# Patient Record
Sex: MALE | Race: WHITE | NOT HISPANIC OR LATINO | URBAN - METROPOLITAN AREA
[De-identification: names, ages, dates, MRNs, and addresses within clinical notes are randomized per-mention and may not be internally consistent; named-entity substitution may affect disease eponyms.]

---

## 2018-07-17 ENCOUNTER — APPOINTMENT (OUTPATIENT)
Dept: RADIOLOGY | Facility: MEDICAL CENTER | Age: 58
DRG: 956 | End: 2018-07-17
Attending: SURGERY
Payer: COMMERCIAL

## 2018-07-17 ENCOUNTER — APPOINTMENT (OUTPATIENT)
Dept: RADIOLOGY | Facility: MEDICAL CENTER | Age: 58
DRG: 956 | End: 2018-07-17
Attending: NEUROLOGICAL SURGERY
Payer: COMMERCIAL

## 2018-07-17 ENCOUNTER — HOSPITAL ENCOUNTER (INPATIENT)
Facility: MEDICAL CENTER | Age: 58
LOS: 14 days | DRG: 956 | End: 2018-07-31
Attending: EMERGENCY MEDICINE | Admitting: SURGERY
Payer: COMMERCIAL

## 2018-07-17 ENCOUNTER — RESOLUTE PROFESSIONAL BILLING HOSPITAL PROF FEE (OUTPATIENT)
Dept: HOSPITALIST | Facility: MEDICAL CENTER | Age: 58
End: 2018-07-17
Payer: COMMERCIAL

## 2018-07-17 DIAGNOSIS — S32.001A LUMBAR BURST FRACTURE, CLOSED, INITIAL ENCOUNTER (HCC): ICD-10-CM

## 2018-07-17 DIAGNOSIS — S72.302A CLOSED FRACTURE OF SHAFT OF LEFT FEMUR, INITIAL ENCOUNTER (HCC): ICD-10-CM

## 2018-07-17 DIAGNOSIS — S13.171A: ICD-10-CM

## 2018-07-17 DIAGNOSIS — S22.42XA FRACTURE OF RIBS, TWO, LEFT, CLOSED, INITIAL ENCOUNTER: ICD-10-CM

## 2018-07-17 PROBLEM — S11.91XA LACERATION OF NECK: Status: ACTIVE | Noted: 2018-07-17
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
## 2018-07-17 PROBLEM — J96.01 ACUTE RESPIRATORY FAILURE WITH HYPOXIA (HCC): Status: ACTIVE | Noted: 2018-07-17

## 2018-07-17 PROBLEM — T79.4XXA NEUROGENIC SHOCK DUE TO TRAUMATIC INJURY (HCC): Status: ACTIVE | Noted: 2018-07-17

## 2018-07-17 PROBLEM — S37.009A: Status: ACTIVE | Noted: 2018-07-17

## 2018-07-17 LAB
ABO GROUP BLD: NORMAL
ABO GROUP BLD: NORMAL
ACTION RANGE TRIGGERED IACRT: YES
ALBUMIN SERPL BCP-MCNC: 2.1 G/DL (ref 3.2–4.9)
ALBUMIN SERPL BCP-MCNC: 4 G/DL (ref 3.2–4.9)
ALBUMIN/GLOB SERPL: 1.5 G/DL
ALBUMIN/GLOB SERPL: 1.5 G/DL
ALP SERPL-CCNC: 33 U/L (ref 30–99)
ALP SERPL-CCNC: 58 U/L (ref 30–99)
ALT SERPL-CCNC: 39 U/L (ref 2–50)
ALT SERPL-CCNC: 58 U/L (ref 2–50)
ANION GAP SERPL CALC-SCNC: 13 MMOL/L (ref 0–11.9)
ANION GAP SERPL CALC-SCNC: 8 MMOL/L (ref 0–11.9)
APTT PPP: 31.1 SEC (ref 24.7–36)
AST SERPL-CCNC: 58 U/L (ref 12–45)
AST SERPL-CCNC: 64 U/L (ref 12–45)
BASE EXCESS BLDA CALC-SCNC: -11 MMOL/L (ref -4–3)
BASE EXCESS BLDA CALC-SCNC: -7 MMOL/L (ref -4–3)
BASE EXCESS BLDA CALC-SCNC: -9 MMOL/L (ref -4–3)
BASE EXCESS BLDA CALC-SCNC: -9 MMOL/L (ref -4–3)
BASOPHILS # BLD AUTO: 0.3 % (ref 0–1.8)
BASOPHILS # BLD: 0.07 K/UL (ref 0–0.12)
BILIRUB SERPL-MCNC: 0.3 MG/DL (ref 0.1–1.5)
BILIRUB SERPL-MCNC: 0.7 MG/DL (ref 0.1–1.5)
BLD GP AB SCN SERPL QL: NORMAL
BODY TEMPERATURE: ABNORMAL CENTIGRADE
BODY TEMPERATURE: ABNORMAL DEGREES
BUN SERPL-MCNC: 17 MG/DL (ref 8–22)
BUN SERPL-MCNC: 19 MG/DL (ref 8–22)
CALCIUM SERPL-MCNC: 6.4 MG/DL (ref 8.5–10.5)
CALCIUM SERPL-MCNC: 9.4 MG/DL (ref 8.5–10.5)
CFT BLD TEG: 5.6 MIN (ref 5–10)
CHLORIDE SERPL-SCNC: 106 MMOL/L (ref 96–112)
CHLORIDE SERPL-SCNC: 118 MMOL/L (ref 96–112)
CLOT ANGLE BLD TEG: 65 DEGREES (ref 53–72)
CLOT LYSIS 30M P MA LENFR BLD TEG: 4.5 % (ref 0–8)
CO2 BLDA-SCNC: 20 MMOL/L (ref 20–33)
CO2 BLDA-SCNC: 20 MMOL/L (ref 20–33)
CO2 BLDA-SCNC: 21 MMOL/L (ref 20–33)
CO2 SERPL-SCNC: 16 MMOL/L (ref 20–33)
CO2 SERPL-SCNC: 20 MMOL/L (ref 20–33)
CREAT SERPL-MCNC: 1.16 MG/DL (ref 0.5–1.4)
CREAT SERPL-MCNC: 1.62 MG/DL (ref 0.5–1.4)
CT.EXTRINSIC BLD ROTEM: 1.8 MIN (ref 1–3)
EKG IMPRESSION: NORMAL
EOSINOPHIL # BLD AUTO: 0.05 K/UL (ref 0–0.51)
EOSINOPHIL NFR BLD: 0.2 % (ref 0–6.9)
ERYTHROCYTE [DISTWIDTH] IN BLOOD BY AUTOMATED COUNT: 43.5 FL (ref 35.9–50)
ERYTHROCYTE [DISTWIDTH] IN BLOOD BY AUTOMATED COUNT: 44.6 FL (ref 35.9–50)
ETHANOL BLD-MCNC: 0 G/DL
GLOBULIN SER CALC-MCNC: 1.4 G/DL (ref 1.9–3.5)
GLOBULIN SER CALC-MCNC: 2.6 G/DL (ref 1.9–3.5)
GLUCOSE BLD-MCNC: 157 MG/DL (ref 65–99)
GLUCOSE BLD-MCNC: 202 MG/DL (ref 65–99)
GLUCOSE SERPL-MCNC: 185 MG/DL (ref 65–99)
GLUCOSE SERPL-MCNC: 189 MG/DL (ref 65–99)
HCO3 BLDA-SCNC: 16 MMOL/L (ref 17–25)
HCO3 BLDA-SCNC: 18.6 MMOL/L (ref 17–25)
HCO3 BLDA-SCNC: 18.6 MMOL/L (ref 17–25)
HCO3 BLDA-SCNC: 19.4 MMOL/L (ref 17–25)
HCT VFR BLD AUTO: 26.9 % (ref 42–52)
HCT VFR BLD AUTO: 41.6 % (ref 42–52)
HGB BLD-MCNC: 13.7 G/DL (ref 14–18)
HGB BLD-MCNC: 9 G/DL (ref 14–18)
HGB BLD-MCNC: 9.2 G/DL (ref 14–18)
IMM GRANULOCYTES # BLD AUTO: 0.17 K/UL (ref 0–0.11)
IMM GRANULOCYTES NFR BLD AUTO: 0.8 % (ref 0–0.9)
INR PPP: 1.27 (ref 0.87–1.13)
INST. QUALIFIED PATIENT IIQPT: YES
LACTATE BLD-SCNC: 3.6 MMOL/L (ref 0.5–2)
LACTATE BLD-SCNC: 6 MMOL/L (ref 0.5–2)
LV EJECT FRACT  99904: 55
LYMPHOCYTES # BLD AUTO: 2.07 K/UL (ref 1–4.8)
LYMPHOCYTES NFR BLD: 9.9 % (ref 22–41)
MAGNESIUM SERPL-MCNC: 2.2 MG/DL (ref 1.5–2.5)
MCF BLD TEG: 61.3 MM (ref 50–70)
MCH RBC QN AUTO: 31.4 PG (ref 27–33)
MCH RBC QN AUTO: 31.8 PG (ref 27–33)
MCHC RBC AUTO-ENTMCNC: 32.9 G/DL (ref 33.7–35.3)
MCHC RBC AUTO-ENTMCNC: 33.5 G/DL (ref 33.7–35.3)
MCV RBC AUTO: 95.1 FL (ref 81.4–97.8)
MCV RBC AUTO: 95.2 FL (ref 81.4–97.8)
MONOCYTES # BLD AUTO: 1.75 K/UL (ref 0–0.85)
MONOCYTES NFR BLD AUTO: 8.3 % (ref 0–13.4)
NEUTROPHILS # BLD AUTO: 16.86 K/UL (ref 1.82–7.42)
NEUTROPHILS NFR BLD: 80.5 % (ref 44–72)
NRBC # BLD AUTO: 0 K/UL
NRBC BLD-RTO: 0 /100 WBC
O2/TOTAL GAS SETTING VFR VENT: 100 %
O2/TOTAL GAS SETTING VFR VENT: 100 %
O2/TOTAL GAS SETTING VFR VENT: 75 %
PA AA BLD-ACNC: NORMAL %
PA ADP BLD-ACNC: NORMAL %
PCO2 BLDA: 39.5 MMHG (ref 26–37)
PCO2 BLDA: 41 MMHG (ref 26–37)
PCO2 BLDA: 46.2 MMHG (ref 26–37)
PCO2 BLDA: 46.2 MMHG (ref 26–37)
PCO2 TEMP ADJ BLDA: 39.9 MMHG (ref 26–37)
PH BLDA: 7.21 [PH] (ref 7.4–7.5)
PH BLDA: 7.21 [PH] (ref 7.4–7.5)
PH BLDA: 7.22 [PH] (ref 7.4–7.5)
PH BLDA: 7.28 [PH] (ref 7.4–7.5)
PH TEMP ADJ BLDA: 7.29 [PH] (ref 7.4–7.5)
PHOSPHATE SERPL-MCNC: 5.5 MG/DL (ref 2.5–4.5)
PLATELET # BLD AUTO: 160 K/UL (ref 164–446)
PLATELET # BLD AUTO: 228 K/UL (ref 164–446)
PMV BLD AUTO: 10 FL (ref 9–12.9)
PMV BLD AUTO: 10.1 FL (ref 9–12.9)
PO2 BLDA: 132 MMHG (ref 64–87)
PO2 BLDA: 150 MMHG (ref 64–87)
PO2 BLDA: 150 MMHG (ref 64–87)
PO2 BLDA: 155.5 MMHG (ref 64–87)
PO2 TEMP ADJ BLDA: 128 MMHG (ref 64–87)
POTASSIUM SERPL-SCNC: 3.6 MMOL/L (ref 3.6–5.5)
POTASSIUM SERPL-SCNC: 3.7 MMOL/L (ref 3.6–5.5)
PROT SERPL-MCNC: 3.5 G/DL (ref 6–8.2)
PROT SERPL-MCNC: 6.6 G/DL (ref 6–8.2)
PROTHROMBIN TIME: 15.6 SEC (ref 12–14.6)
RBC # BLD AUTO: 2.83 M/UL (ref 4.7–6.1)
RBC # BLD AUTO: 4.37 M/UL (ref 4.7–6.1)
RH BLD: NORMAL
RH BLD: NORMAL
SAO2 % BLDA: 98.2 % (ref 93–99)
SAO2 % BLDA: 99 % (ref 93–99)
SODIUM SERPL-SCNC: 139 MMOL/L (ref 135–145)
SODIUM SERPL-SCNC: 142 MMOL/L (ref 135–145)
SPECIMEN DRAWN FROM PATIENT: ABNORMAL
TEG ALGORITHM TGALG: NORMAL
TRIGL SERPL-MCNC: 193 MG/DL (ref 0–149)
TROPONIN I SERPL-MCNC: 0.01 NG/ML (ref 0–0.04)
WBC # BLD AUTO: 18.6 K/UL (ref 4.8–10.8)
WBC # BLD AUTO: 21 K/UL (ref 4.8–10.8)

## 2018-07-17 PROCEDURE — 0RGA071 FUSION OF THORACOLUMBAR VERTEBRAL JOINT WITH AUTOLOGOUS TISSUE SUBSTITUTE, POSTERIOR APPROACH, POSTERIOR COLUMN, OPEN APPROACH: ICD-10-PCS | Performed by: NEUROLOGICAL SURGERY

## 2018-07-17 PROCEDURE — 85014 HEMATOCRIT: CPT

## 2018-07-17 PROCEDURE — 0PS304Z REPOSITION CERVICAL VERTEBRA WITH INTERNAL FIXATION DEVICE, OPEN APPROACH: ICD-10-PCS | Performed by: NEUROLOGICAL SURGERY

## 2018-07-17 PROCEDURE — 110372 HCHG SHELL REV 278: Performed by: NEUROLOGICAL SURGERY

## 2018-07-17 PROCEDURE — 71045 X-RAY EXAM CHEST 1 VIEW: CPT

## 2018-07-17 PROCEDURE — C1713 ANCHOR/SCREW BN/BN,TIS/BN: HCPCS | Performed by: NEUROLOGICAL SURGERY

## 2018-07-17 PROCEDURE — 83605 ASSAY OF LACTIC ACID: CPT

## 2018-07-17 PROCEDURE — 700111 HCHG RX REV CODE 636 W/ 250 OVERRIDE (IP): Performed by: SURGERY

## 2018-07-17 PROCEDURE — 96365 THER/PROPH/DIAG IV INF INIT: CPT

## 2018-07-17 PROCEDURE — 503195 HCHG SEALER, BIPOLAR AQUAMANTYS: Performed by: NEUROLOGICAL SURGERY

## 2018-07-17 PROCEDURE — 500367 HCHG DRAIN KIT, HEMOVAC: Performed by: NEUROLOGICAL SURGERY

## 2018-07-17 PROCEDURE — 501411 HCHG SPONGE, BABY LAP W/O RINGS: Performed by: NEUROLOGICAL SURGERY

## 2018-07-17 PROCEDURE — 93308 TTE F-UP OR LMTD: CPT

## 2018-07-17 PROCEDURE — 500389 HCHG DRAIN, RESERVOIR SUCT JP 100CC: Performed by: NEUROLOGICAL SURGERY

## 2018-07-17 PROCEDURE — 72128 CT CHEST SPINE W/O DYE: CPT

## 2018-07-17 PROCEDURE — A6222 GAUZE <=16 IN NO W/SAL W/O B: HCPCS | Performed by: NEUROLOGICAL SURGERY

## 2018-07-17 PROCEDURE — 009T0ZZ DRAINAGE OF SPINAL MENINGES, OPEN APPROACH: ICD-10-PCS | Performed by: NEUROLOGICAL SURGERY

## 2018-07-17 PROCEDURE — 76705 ECHO EXAM OF ABDOMEN: CPT

## 2018-07-17 PROCEDURE — 500700 HCHG HEMOCLIP, SMALL (RED): Performed by: NEUROLOGICAL SURGERY

## 2018-07-17 PROCEDURE — 0RS104Z REPOSITION CERVICAL VERTEBRAL JOINT WITH INTERNAL FIXATION DEVICE, OPEN APPROACH: ICD-10-PCS | Performed by: NEUROLOGICAL SURGERY

## 2018-07-17 PROCEDURE — 99291 CRITICAL CARE FIRST HOUR: CPT

## 2018-07-17 PROCEDURE — 82947 ASSAY GLUCOSE BLOOD QUANT: CPT | Mod: 91

## 2018-07-17 PROCEDURE — 700101 HCHG RX REV CODE 250: Performed by: SURGERY

## 2018-07-17 PROCEDURE — 73551 X-RAY EXAM OF FEMUR 1: CPT | Mod: LT

## 2018-07-17 PROCEDURE — 80053 COMPREHEN METABOLIC PANEL: CPT

## 2018-07-17 PROCEDURE — 82330 ASSAY OF CALCIUM: CPT

## 2018-07-17 PROCEDURE — 160031 HCHG SURGERY MINUTES - 1ST 30 MINS LEVEL 5: Performed by: NEUROLOGICAL SURGERY

## 2018-07-17 PROCEDURE — 2W6MX0Z TRACTION OF LEFT LOWER EXTREMITY USING TRACTION APPARATUS: ICD-10-PCS | Performed by: ORTHOPAEDIC SURGERY

## 2018-07-17 PROCEDURE — 302830 HCHG BUCKS UNIVERSAL TRACTION BOOT

## 2018-07-17 PROCEDURE — 80048 BASIC METABOLIC PNL TOTAL CA: CPT

## 2018-07-17 PROCEDURE — 700117 HCHG RX CONTRAST REV CODE 255: Performed by: EMERGENCY MEDICINE

## 2018-07-17 PROCEDURE — 84478 ASSAY OF TRIGLYCERIDES: CPT

## 2018-07-17 PROCEDURE — 85610 PROTHROMBIN TIME: CPT

## 2018-07-17 PROCEDURE — 94002 VENT MGMT INPAT INIT DAY: CPT

## 2018-07-17 PROCEDURE — 86900 BLOOD TYPING SEROLOGIC ABO: CPT

## 2018-07-17 PROCEDURE — C1821 INTERSPINOUS IMPLANT: HCPCS | Performed by: NEUROLOGICAL SURGERY

## 2018-07-17 PROCEDURE — 110454 HCHG SHELL REV 250: Performed by: NEUROLOGICAL SURGERY

## 2018-07-17 PROCEDURE — 305308 HCHG STAPLER,SKIN,DISP.

## 2018-07-17 PROCEDURE — P9045 ALBUMIN (HUMAN), 5%, 250 ML: HCPCS

## 2018-07-17 PROCEDURE — 700105 HCHG RX REV CODE 258: Performed by: SURGERY

## 2018-07-17 PROCEDURE — 72131 CT LUMBAR SPINE W/O DYE: CPT

## 2018-07-17 PROCEDURE — 84484 ASSAY OF TROPONIN QUANT: CPT

## 2018-07-17 PROCEDURE — 500373 HCHG DRAIN, J-P FLAT 10MM 7044: Performed by: NEUROLOGICAL SURGERY

## 2018-07-17 PROCEDURE — 82803 BLOOD GASES ANY COMBINATION: CPT | Mod: 91

## 2018-07-17 PROCEDURE — G0390 TRAUMA RESPONS W/HOSP CRITI: HCPCS

## 2018-07-17 PROCEDURE — 85347 COAGULATION TIME ACTIVATED: CPT

## 2018-07-17 PROCEDURE — 500371 HCHG DRAIN, BLAKE 10MM: Performed by: NEUROLOGICAL SURGERY

## 2018-07-17 PROCEDURE — 37799 UNLISTED PX VASCULAR SURGERY: CPT

## 2018-07-17 PROCEDURE — 770022 HCHG ROOM/CARE - ICU (200)

## 2018-07-17 PROCEDURE — 500864 HCHG NEEDLE, SPINAL 18G: Performed by: NEUROLOGICAL SURGERY

## 2018-07-17 PROCEDURE — 5A1945Z RESPIRATORY VENTILATION, 24-96 CONSECUTIVE HOURS: ICD-10-PCS | Performed by: SURGERY

## 2018-07-17 PROCEDURE — 501838 HCHG SUTURE GENERAL: Performed by: NEUROLOGICAL SURGERY

## 2018-07-17 PROCEDURE — 85576 BLOOD PLATELET AGGREGATION: CPT | Mod: 91

## 2018-07-17 PROCEDURE — 700102 HCHG RX REV CODE 250 W/ 637 OVERRIDE(OP): Performed by: SURGERY

## 2018-07-17 PROCEDURE — 72040 X-RAY EXAM NECK SPINE 2-3 VW: CPT

## 2018-07-17 PROCEDURE — 83735 ASSAY OF MAGNESIUM: CPT

## 2018-07-17 PROCEDURE — 700101 HCHG RX REV CODE 250

## 2018-07-17 PROCEDURE — 85018 HEMOGLOBIN: CPT

## 2018-07-17 PROCEDURE — 82962 GLUCOSE BLOOD TEST: CPT

## 2018-07-17 PROCEDURE — 85384 FIBRINOGEN ACTIVITY: CPT

## 2018-07-17 PROCEDURE — 36430 TRANSFUSION BLD/BLD COMPNT: CPT

## 2018-07-17 PROCEDURE — 0SG1071 FUSION OF 2 OR MORE LUMBAR VERTEBRAL JOINTS WITH AUTOLOGOUS TISSUE SUBSTITUTE, POSTERIOR APPROACH, POSTERIOR COLUMN, OPEN APPROACH: ICD-10-PCS | Performed by: NEUROLOGICAL SURGERY

## 2018-07-17 PROCEDURE — 86850 RBC ANTIBODY SCREEN: CPT

## 2018-07-17 PROCEDURE — 36556 INSERT NON-TUNNEL CV CATH: CPT

## 2018-07-17 PROCEDURE — 85730 THROMBOPLASTIN TIME PARTIAL: CPT

## 2018-07-17 PROCEDURE — 80307 DRUG TEST PRSMV CHEM ANLYZR: CPT

## 2018-07-17 PROCEDURE — 93308 TTE F-UP OR LMTD: CPT | Mod: 26 | Performed by: INTERNAL MEDICINE

## 2018-07-17 PROCEDURE — 85025 COMPLETE CBC W/AUTO DIFF WBC: CPT

## 2018-07-17 PROCEDURE — 85027 COMPLETE CBC AUTOMATED: CPT

## 2018-07-17 PROCEDURE — 160009 HCHG ANES TIME/MIN: Performed by: NEUROLOGICAL SURGERY

## 2018-07-17 PROCEDURE — 72125 CT NECK SPINE W/O DYE: CPT

## 2018-07-17 PROCEDURE — 84295 ASSAY OF SERUM SODIUM: CPT | Mod: 91

## 2018-07-17 PROCEDURE — P9016 RBC LEUKOCYTES REDUCED: HCPCS | Mod: 91

## 2018-07-17 PROCEDURE — 500698 HCHG HEMOCLIP, MEDIUM: Performed by: NEUROLOGICAL SURGERY

## 2018-07-17 PROCEDURE — 0RG10A0 FUSION OF CERVICAL VERTEBRAL JOINT WITH INTERBODY FUSION DEVICE, ANTERIOR APPROACH, ANTERIOR COLUMN, OPEN APPROACH: ICD-10-PCS | Performed by: NEUROLOGICAL SURGERY

## 2018-07-17 PROCEDURE — 500885 HCHG PACK, JACKSON TABLE: Performed by: NEUROLOGICAL SURGERY

## 2018-07-17 PROCEDURE — 70450 CT HEAD/BRAIN W/O DYE: CPT

## 2018-07-17 PROCEDURE — 84100 ASSAY OF PHOSPHORUS: CPT

## 2018-07-17 PROCEDURE — A6402 STERILE GAUZE <= 16 SQ IN: HCPCS | Performed by: NEUROLOGICAL SURGERY

## 2018-07-17 PROCEDURE — 72080 X-RAY EXAM THORACOLMB 2/> VW: CPT

## 2018-07-17 PROCEDURE — 02HV33Z INSERTION OF INFUSION DEVICE INTO SUPERIOR VENA CAVA, PERCUTANEOUS APPROACH: ICD-10-PCS | Performed by: SURGERY

## 2018-07-17 PROCEDURE — 501445 HCHG STAPLER, SKIN DISP: Performed by: NEUROLOGICAL SURGERY

## 2018-07-17 PROCEDURE — 110371 HCHG SHELL REV 272: Performed by: NEUROLOGICAL SURGERY

## 2018-07-17 PROCEDURE — 500331 HCHG COTTONOID, SURG PATTIE: Performed by: NEUROLOGICAL SURGERY

## 2018-07-17 PROCEDURE — 700111 HCHG RX REV CODE 636 W/ 250 OVERRIDE (IP)

## 2018-07-17 PROCEDURE — 160042 HCHG SURGERY MINUTES - EA ADDL 1 MIN LEVEL 5: Performed by: NEUROLOGICAL SURGERY

## 2018-07-17 PROCEDURE — 36620 INSERTION CATHETER ARTERY: CPT

## 2018-07-17 PROCEDURE — 93005 ELECTROCARDIOGRAM TRACING: CPT | Performed by: SURGERY

## 2018-07-17 PROCEDURE — 0RG1071 FUSION OF CERVICAL VERTEBRAL JOINT WITH AUTOLOGOUS TISSUE SUBSTITUTE, POSTERIOR APPROACH, POSTERIOR COLUMN, OPEN APPROACH: ICD-10-PCS | Performed by: NEUROLOGICAL SURGERY

## 2018-07-17 PROCEDURE — 72170 X-RAY EXAM OF PELVIS: CPT

## 2018-07-17 PROCEDURE — 86901 BLOOD TYPING SEROLOGIC RH(D): CPT

## 2018-07-17 PROCEDURE — 99292 CRITICAL CARE ADDL 30 MIN: CPT | Performed by: SURGERY

## 2018-07-17 PROCEDURE — 160048 HCHG OR STATISTICAL LEVEL 1-5: Performed by: NEUROLOGICAL SURGERY

## 2018-07-17 PROCEDURE — 93010 ELECTROCARDIOGRAM REPORT: CPT | Performed by: INTERNAL MEDICINE

## 2018-07-17 PROCEDURE — 30233N1 TRANSFUSION OF NONAUTOLOGOUS RED BLOOD CELLS INTO PERIPHERAL VEIN, PERCUTANEOUS APPROACH: ICD-10-PCS | Performed by: SURGERY

## 2018-07-17 PROCEDURE — 86923 COMPATIBILITY TEST ELECTRIC: CPT | Mod: 91

## 2018-07-17 PROCEDURE — 99291 CRITICAL CARE FIRST HOUR: CPT | Performed by: SURGERY

## 2018-07-17 PROCEDURE — 0RB30ZZ EXCISION OF CERVICAL VERTEBRAL DISC, OPEN APPROACH: ICD-10-PCS | Performed by: NEUROLOGICAL SURGERY

## 2018-07-17 PROCEDURE — 304999 HCHG REPAIR-SIMPLE/INTERMED LEVEL 1

## 2018-07-17 PROCEDURE — 84132 ASSAY OF SERUM POTASSIUM: CPT

## 2018-07-17 PROCEDURE — 03HY32Z INSERTION OF MONITORING DEVICE INTO UPPER ARTERY, PERCUTANEOUS APPROACH: ICD-10-PCS | Performed by: SURGERY

## 2018-07-17 PROCEDURE — 500122 HCHG BOVIE, BLADE: Performed by: NEUROLOGICAL SURGERY

## 2018-07-17 PROCEDURE — C1894 INTRO/SHEATH, NON-LASER: HCPCS | Performed by: EMERGENCY MEDICINE

## 2018-07-17 PROCEDURE — 502000 HCHG MISC OR IMPLANTS RC 0278: Performed by: NEUROLOGICAL SURGERY

## 2018-07-17 PROCEDURE — C1751 CATH, INF, PER/CENT/MIDLINE: HCPCS

## 2018-07-17 PROCEDURE — A9270 NON-COVERED ITEM OR SERVICE: HCPCS | Performed by: SURGERY

## 2018-07-17 PROCEDURE — 0QS004Z REPOSITION LUMBAR VERTEBRA WITH INTERNAL FIXATION DEVICE, OPEN APPROACH: ICD-10-PCS | Performed by: NEUROLOGICAL SURGERY

## 2018-07-17 PROCEDURE — 0PS3XZZ REPOSITION CERVICAL VERTEBRA, EXTERNAL APPROACH: ICD-10-PCS | Performed by: NEUROLOGICAL SURGERY

## 2018-07-17 PROCEDURE — 71260 CT THORAX DX C+: CPT

## 2018-07-17 DEVICE — IMPLANTABLE DEVICE: Type: IMPLANTABLE DEVICE | Status: FUNCTIONAL

## 2018-07-17 DEVICE — BONE CHIPS CANC 4-10MM 15CC - CRUSHED  FREEZE DRIED ONLY: Type: IMPLANTABLE DEVICE | Status: FUNCTIONAL

## 2018-07-17 DEVICE — CAP LOCKING QUARTEX THREADED LOCKING (1TCONX24=24): Type: IMPLANTABLE DEVICE | Status: FUNCTIONAL

## 2018-07-17 DEVICE — BONE CHIPS CANC 4-10MM 30CC - CRUSHED  FREEZE DRIED ONLY: Type: IMPLANTABLE DEVICE | Status: FUNCTIONAL

## 2018-07-17 DEVICE — CAP LOCKING CREO THREADED (1EA): Type: IMPLANTABLE DEVICE | Status: FUNCTIONAL

## 2018-07-17 RX ORDER — SODIUM CHLORIDE, SODIUM LACTATE, POTASSIUM CHLORIDE, CALCIUM CHLORIDE 600; 310; 30; 20 MG/100ML; MG/100ML; MG/100ML; MG/100ML
INJECTION, SOLUTION INTRAVENOUS ONCE
Status: COMPLETED | OUTPATIENT
Start: 2018-07-17 | End: 2018-07-17

## 2018-07-17 RX ORDER — SODIUM CHLORIDE, SODIUM LACTATE, POTASSIUM CHLORIDE, CALCIUM CHLORIDE 600; 310; 30; 20 MG/100ML; MG/100ML; MG/100ML; MG/100ML
INJECTION, SOLUTION INTRAVENOUS CONTINUOUS
Status: DISCONTINUED | OUTPATIENT
Start: 2018-07-17 | End: 2018-07-18

## 2018-07-17 RX ORDER — SODIUM CHLORIDE 9 MG/ML
1000 INJECTION, SOLUTION INTRAVENOUS ONCE
Status: COMPLETED | OUTPATIENT
Start: 2018-07-17 | End: 2018-07-17

## 2018-07-17 RX ORDER — ONDANSETRON 2 MG/ML
4 INJECTION INTRAMUSCULAR; INTRAVENOUS EVERY 4 HOURS PRN
Status: DISCONTINUED | OUTPATIENT
Start: 2018-07-17 | End: 2018-07-18

## 2018-07-17 RX ORDER — NOREPINEPHRINE BITARTRATE 1 MG/ML
INJECTION, SOLUTION INTRAVENOUS
Status: COMPLETED
Start: 2018-07-17 | End: 2018-07-17

## 2018-07-17 RX ORDER — HALOPERIDOL 5 MG/ML
5 INJECTION INTRAMUSCULAR EVERY 4 HOURS PRN
Status: DISCONTINUED | OUTPATIENT
Start: 2018-07-17 | End: 2018-07-17

## 2018-07-17 RX ORDER — SODIUM CHLORIDE, SODIUM LACTATE, POTASSIUM CHLORIDE, AND CALCIUM CHLORIDE .6; .31; .03; .02 G/100ML; G/100ML; G/100ML; G/100ML
IRRIGANT IRRIGATION
Status: DISCONTINUED | OUTPATIENT
Start: 2018-07-17 | End: 2018-07-17 | Stop reason: HOSPADM

## 2018-07-17 RX ORDER — VANCOMYCIN HYDROCHLORIDE 500 MG/10ML
INJECTION, POWDER, LYOPHILIZED, FOR SOLUTION INTRAVENOUS
Status: COMPLETED | OUTPATIENT
Start: 2018-07-17 | End: 2018-07-17

## 2018-07-17 RX ORDER — 3% SODIUM CHLORIDE 3 G/100ML
INJECTION, SOLUTION INTRAVENOUS ONCE
Status: COMPLETED | OUTPATIENT
Start: 2018-07-17 | End: 2018-07-17

## 2018-07-17 RX ORDER — ACETAMINOPHEN 325 MG/1
650 TABLET ORAL EVERY 4 HOURS PRN
Status: DISCONTINUED | OUTPATIENT
Start: 2018-07-17 | End: 2018-07-31 | Stop reason: HOSPADM

## 2018-07-17 RX ORDER — OXYCODONE HYDROCHLORIDE 5 MG/1
5 TABLET ORAL
Status: DISCONTINUED | OUTPATIENT
Start: 2018-07-17 | End: 2018-07-17

## 2018-07-17 RX ORDER — BISACODYL 10 MG
10 SUPPOSITORY, RECTAL RECTAL
Status: DISCONTINUED | OUTPATIENT
Start: 2018-07-17 | End: 2018-07-18

## 2018-07-17 RX ORDER — CHLORHEXIDINE GLUCONATE ORAL RINSE 1.2 MG/ML
15 SOLUTION DENTAL 2 TIMES DAILY
Status: DISCONTINUED | OUTPATIENT
Start: 2018-07-17 | End: 2018-07-18

## 2018-07-17 RX ORDER — BACITRACIN ZINC AND POLYMYXIN B SULFATE 500; 1000 [USP'U]/G; [USP'U]/G
OINTMENT TOPICAL 2 TIMES DAILY
Status: DISCONTINUED | OUTPATIENT
Start: 2018-07-17 | End: 2018-07-29

## 2018-07-17 RX ORDER — AMOXICILLIN 250 MG
1 CAPSULE ORAL NIGHTLY
Status: DISCONTINUED | OUTPATIENT
Start: 2018-07-17 | End: 2018-07-18

## 2018-07-17 RX ORDER — BACITRACIN ZINC AND POLYMYXIN B SULFATE 500; 1000 [USP'U]/G; [USP'U]/G
OINTMENT TOPICAL 3 TIMES DAILY
Status: DISCONTINUED | OUTPATIENT
Start: 2018-07-17 | End: 2018-07-17

## 2018-07-17 RX ORDER — SODIUM CHLORIDE 450 MG/100ML
INJECTION, SOLUTION INTRAVENOUS
Status: ACTIVE
Start: 2018-07-17 | End: 2018-07-17

## 2018-07-17 RX ORDER — ACETAMINOPHEN 650 MG/1
650 SUPPOSITORY RECTAL EVERY 4 HOURS PRN
Status: DISCONTINUED | OUTPATIENT
Start: 2018-07-17 | End: 2018-07-31 | Stop reason: HOSPADM

## 2018-07-17 RX ORDER — ENEMA 19; 7 G/133ML; G/133ML
1 ENEMA RECTAL
Status: DISCONTINUED | OUTPATIENT
Start: 2018-07-17 | End: 2018-07-18

## 2018-07-17 RX ORDER — POLYETHYLENE GLYCOL 3350 17 G/17G
1 POWDER, FOR SOLUTION ORAL 2 TIMES DAILY
Status: DISCONTINUED | OUTPATIENT
Start: 2018-07-17 | End: 2018-07-18

## 2018-07-17 RX ORDER — OXYCODONE HYDROCHLORIDE 10 MG/1
10 TABLET ORAL
Status: DISCONTINUED | OUTPATIENT
Start: 2018-07-17 | End: 2018-07-17

## 2018-07-17 RX ORDER — AMOXICILLIN 250 MG
1 CAPSULE ORAL
Status: DISCONTINUED | OUTPATIENT
Start: 2018-07-17 | End: 2018-07-18

## 2018-07-17 RX ORDER — SODIUM CHLORIDE, SODIUM LACTATE, POTASSIUM CHLORIDE, CALCIUM CHLORIDE 600; 310; 30; 20 MG/100ML; MG/100ML; MG/100ML; MG/100ML
INJECTION, SOLUTION INTRAVENOUS CONTINUOUS
Status: DISCONTINUED | OUTPATIENT
Start: 2018-07-17 | End: 2018-07-17

## 2018-07-17 RX ORDER — DOCUSATE SODIUM 50 MG/5ML
100 LIQUID ORAL 2 TIMES DAILY
Status: DISCONTINUED | OUTPATIENT
Start: 2018-07-17 | End: 2018-07-29

## 2018-07-17 RX ORDER — FAMOTIDINE 20 MG/1
20 TABLET, FILM COATED ORAL 2 TIMES DAILY
Status: DISCONTINUED | OUTPATIENT
Start: 2018-07-17 | End: 2018-07-23

## 2018-07-17 RX ORDER — SODIUM CHLORIDE 9 MG/ML
INJECTION, SOLUTION INTRAVENOUS
Status: COMPLETED | OUTPATIENT
Start: 2018-07-17 | End: 2018-07-17

## 2018-07-17 RX ORDER — DEXTROSE MONOHYDRATE 25 G/50ML
25 INJECTION, SOLUTION INTRAVENOUS
Status: DISCONTINUED | OUTPATIENT
Start: 2018-07-17 | End: 2018-07-20

## 2018-07-17 RX ORDER — BUPIVACAINE HYDROCHLORIDE AND EPINEPHRINE 5; 5 MG/ML; UG/ML
INJECTION, SOLUTION EPIDURAL; INTRACAUDAL; PERINEURAL
Status: DISCONTINUED | OUTPATIENT
Start: 2018-07-17 | End: 2018-07-17 | Stop reason: HOSPADM

## 2018-07-17 RX ADMIN — SODIUM CHLORIDE 1000 ML: 9 INJECTION, SOLUTION INTRAVENOUS at 10:45

## 2018-07-17 RX ADMIN — SODIUM CHLORIDE, POTASSIUM CHLORIDE, SODIUM LACTATE AND CALCIUM CHLORIDE: 600; 310; 30; 20 INJECTION, SOLUTION INTRAVENOUS at 13:41

## 2018-07-17 RX ADMIN — PHENYLEPHRINE HYDROCHLORIDE 50 MCG/MIN: 10 INJECTION INTRAVENOUS at 11:45

## 2018-07-17 RX ADMIN — IOHEXOL 100 ML: 350 INJECTION, SOLUTION INTRAVENOUS at 09:14

## 2018-07-17 RX ADMIN — NOREPINEPHRINE BITARTRATE 25 MCG/MIN: 1 INJECTION INTRAVENOUS at 14:49

## 2018-07-17 RX ADMIN — INSULIN HUMAN 2 UNITS: 100 INJECTION, SOLUTION PARENTERAL at 12:39

## 2018-07-17 RX ADMIN — Medication 1 EACH: at 11:59

## 2018-07-17 RX ADMIN — SODIUM CHLORIDE, POTASSIUM CHLORIDE, SODIUM LACTATE AND CALCIUM CHLORIDE: 600; 310; 30; 20 INJECTION, SOLUTION INTRAVENOUS at 09:30

## 2018-07-17 RX ADMIN — INSULIN HUMAN 3 UNITS: 100 INJECTION, SOLUTION PARENTERAL at 23:25

## 2018-07-17 RX ADMIN — FAMOTIDINE 20 MG: 10 INJECTION, SOLUTION INTRAVENOUS at 23:16

## 2018-07-17 RX ADMIN — SODIUM CHLORIDE, POTASSIUM CHLORIDE, SODIUM LACTATE AND CALCIUM CHLORIDE: 600; 310; 30; 20 INJECTION, SOLUTION INTRAVENOUS at 09:40

## 2018-07-17 RX ADMIN — NOREPINEPHRINE BITARTRATE 30 MCG/MIN: 1 INJECTION INTRAVENOUS at 09:41

## 2018-07-17 RX ADMIN — CEFAZOLIN SODIUM 2 G: 1 INJECTION, SOLUTION INTRAVENOUS at 08:53

## 2018-07-17 RX ADMIN — SODIUM CHLORIDE 1000 ML: 9 INJECTION, SOLUTION INTRAVENOUS at 11:44

## 2018-07-17 RX ADMIN — SODIUM CHLORIDE 1000 ML: 9 INJECTION, SOLUTION INTRAVENOUS at 10:26

## 2018-07-17 RX ADMIN — NOREPINEPHRINE BITARTRATE 8 MG: 1 INJECTION, SOLUTION, CONCENTRATE INTRAVENOUS at 09:30

## 2018-07-17 RX ADMIN — ACETAMINOPHEN 650 MG: 650 SUPPOSITORY RECTAL at 23:30

## 2018-07-17 RX ADMIN — Medication 50 MCG/HR: at 10:47

## 2018-07-17 RX ADMIN — PROPOFOL 30 MCG/KG/MIN: 10 INJECTION, EMULSION INTRAVENOUS at 10:28

## 2018-07-17 RX ADMIN — SODIUM CHLORIDE 1000 ML: 9 INJECTION, SOLUTION INTRAVENOUS at 08:44

## 2018-07-17 RX ADMIN — SODIUM CHLORIDE, POTASSIUM CHLORIDE, SODIUM LACTATE AND CALCIUM CHLORIDE: 600; 310; 30; 20 INJECTION, SOLUTION INTRAVENOUS at 09:45

## 2018-07-17 RX ADMIN — SODIUM CHLORIDE: 3 INJECTION, SOLUTION INTRAVENOUS at 10:05

## 2018-07-17 RX ADMIN — SODIUM CHLORIDE 1000 ML: 9 INJECTION, SOLUTION INTRAVENOUS at 11:30
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
## 2018-07-17 NOTE — OR NURSING
Pt arrived to pre op holding. Armbands verified with OR RN. Surgical consents for Dr. Messina and Dr. Patrick signed by 2 MD's, anesthesia consent signed by 2 MD's. Case declared emergent, pt taken back to OR quickly with OR staff. Full pre op assessment not able to be completed.

## 2018-07-17 NOTE — PROGRESS NOTES
MD updated regarding pt's drop in hemoglobin (13.7 to 9) and hematocrit (41.6 (to 26). MD aware. No new orders received.

## 2018-07-17 NOTE — CARE PLAN
Problem: Psychosocial Needs:  Goal: Level of anxiety will decrease  Outcome: PROGRESSING AS EXPECTED  RN to reorient pt to place, event, and time. RN to orient pt to injuries and plan of treatment to help ease anxiety and encourage pt comfort.
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
## 2018-07-17 NOTE — DISCHARGE PLANNING
HI contacted [facility] Dispatch [phone] and no confirmation of identities at this time.    HI will call back in 30 minutes [phone]

## 2018-07-17 NOTE — PROGRESS NOTES
Critical lab result from [facility] of albumin 2.1 and calcium of 6.4. Preformed corrected calcium calculation for result of 7.9. Meng, PharmD aware.

## 2018-07-17 NOTE — CARE PLAN
Problem: Communication  Goal: The ability to communicate needs accurately and effectively will improve  Outcome: PROGRESSING AS EXPECTED  RN to collaborate with pt on alternate methods of communication such as opening/closing eyes, nodding/shaking head, mouthing, and gesturing.
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
## 2018-07-17 NOTE — ED NOTES
C-spine maintained; MD to examine back; no instability or step off noted; abrasion to inferior R elbow

## 2018-07-17 NOTE — PROGRESS NOTES
Patient arrived to S-103 with ED RN and trauma MD. Patient connected to ICU monitor and transferred to trauma bed.     0917 - Central line and arterial line being placed at bedside. See [procedure navigator.     0925 - Orders for LR at 125 and norephinephrine maxed    0931 - patient connected to code cart    0935 - 1L bolus of LR x2

## 2018-07-17 NOTE — PROGRESS NOTES
Transport at bedside to bring pt to OR. Pre-op monitor not proper for transport due to blood pressure connection missing. Pt to use ICU monitor to go to OR. Pt transferred with ACLS RN, RT, and transport.     1500- Report given to OR, RN and Anesthesiologist. Care of pt transfer to OR team at this time.

## 2018-07-17 NOTE — PROCEDURES
"Central line Op Note    Date of operation: 7/17/2018    Preoperative diagnosis: acute respiratory failure (518.81) and traumatic shock (958.4)    Postoperative diagnosis: acute respiratory failure (518.81) and traumatic shock (958.4)    Operation performed: Insertion of right subclavian introducer and triple-lumen catheter placement.    Surgeon: Dr. Snider    Anesthesia: local anesthesia and pharmacologic restraint     Indications: The patient is a 89 y.o. unknown. Placement of a central line was performed in the SICU    Procedure: Following implied emergent consent, the patient was properly identified and optimally positioned in bed. Maximal barrier precautions were employed. A\" time out\" was initiated. The correct patient, procedure, and operative site was verified. The patient's allergy status was assessed. Procedural modifications were made as required.    The right chest wall was prepped with chlorhexidine and draped into a sterile field  The patient was placed in a shallow Trendelenburg position. The subclavian vein was accessed percutaneously and a wire advanced without resistance. The Cordis catheter was then placed over the wire and the introducer and was removed. For additional access, a previously flushed triple lumen catheter was passed using sterile Selldinger technique through the introducer port and both lines were secured to the skin with silk sutures. All of the ports flushed and aspirated freely. The site was cleaned. An antibacterial disk was placed about the catheter exit site and dressed with a transparent sterile occlusive dressing.    The patient tolerated the procedure well. There were no apparent immediate complications. A stat portable chest radiograph was ordered.    "

## 2018-07-17 NOTE — PROCEDURES
Arterial Line Procedure    Date of operation: 7/17/2018    Preoperative diagnosis: Neurogenic shock    Postoperative diagnosis: Same    Operation performed: insertion of right radial artery catheter.    Surgeon: Dr. Snider    Anesthesia: local anesthesia    Indications: The patient is a 89 y.o. unknown with neurogenic shock secondary to multilevel spinal organ injury. An arterial line is placed in the SICU    Procedure: Following implied emergent consent, the patient was properly identified and optimally positioned in bed. An Dov test was employed to ensure collateral circulation via the ulnar artery to the hand. The ventral surface of the forearm was exposed. The wrist was dorsiflexed and secured. The wrist was prepped and draped in a sterile manner.  The radial artery was canulated percutaneously using ultrasound guidance. A wire was advanced without resistance. The arterial catheter was advanced over the wire using sterile Selldinger technique and connected to an invasive hemodynamic monitoring line. A satisfactory arterial waveform was observed. The catheter was secured to the skin with a slik suture. A sterile dressing was applied.  The patient tolerated the procedure well. There were no apparent immediate complications.

## 2018-07-17 NOTE — DISCHARGE PLANNING
HI contacted [facility] [phone] and spoke to Marilin. She is placing another call to Kimberley to contact HI to discuss getting identification on Pt and notifying family.    SW awaiting phone call from [facility] Department.

## 2018-07-17 NOTE — PROGRESS NOTES
Attention traction to pt's L LE. [place] traction is being replaced with skeletal traction to L LE per [facility]
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
## 2018-07-18 ENCOUNTER — APPOINTMENT (OUTPATIENT)
Dept: RADIOLOGY | Facility: MEDICAL CENTER | Age: 58
DRG: 956 | End: 2018-07-18
Attending: SURGERY
Payer: COMMERCIAL

## 2018-07-18 ENCOUNTER — APPOINTMENT (OUTPATIENT)
Dept: RADIOLOGY | Facility: MEDICAL CENTER | Age: 58
DRG: 956 | End: 2018-07-18
Attending: PHYSICIAN ASSISTANT
Payer: COMMERCIAL

## 2018-07-18 LAB
ACTION RANGE TRIGGERED IACRT: NO
ACTION RANGE TRIGGERED IACRT: YES
ACTION RANGE TRIGGERED IACRT: YES
ALBUMIN SERPL BCP-MCNC: 2.8 G/DL (ref 3.2–4.9)
ALBUMIN/GLOB SERPL: 2.2 G/DL
ALP SERPL-CCNC: 26 U/L (ref 30–99)
ALT SERPL-CCNC: 38 U/L (ref 2–50)
ANION GAP SERPL CALC-SCNC: 15 MMOL/L (ref 0–11.9)
ANION GAP SERPL CALC-SCNC: 5 MMOL/L (ref 0–11.9)
ANION GAP SERPL CALC-SCNC: 9 MMOL/L (ref 0–11.9)
AST SERPL-CCNC: 87 U/L (ref 12–45)
BASE EXCESS BLDA CALC-SCNC: -11 MMOL/L (ref -4–3)
BASE EXCESS BLDA CALC-SCNC: -4 MMOL/L (ref -4–3)
BASE EXCESS BLDA CALC-SCNC: -8 MMOL/L (ref -4–3)
BASOPHILS # BLD AUTO: 0.1 % (ref 0–1.8)
BASOPHILS # BLD: 0.01 K/UL (ref 0–0.12)
BILIRUB SERPL-MCNC: 0.8 MG/DL (ref 0.1–1.5)
BODY TEMPERATURE: ABNORMAL DEGREES
BUN SERPL-MCNC: 16 MG/DL (ref 8–22)
BUN SERPL-MCNC: 17 MG/DL (ref 8–22)
BUN SERPL-MCNC: 17 MG/DL (ref 8–22)
CA-I BLD ISE-SCNC: 0.95 MMOL/L (ref 1.1–1.3)
CA-I BLD ISE-SCNC: 1.07 MMOL/L (ref 1.1–1.3)
CALCIUM SERPL-MCNC: 7.3 MG/DL (ref 8.5–10.5)
CALCIUM SERPL-MCNC: 7.4 MG/DL (ref 8.5–10.5)
CALCIUM SERPL-MCNC: 7.7 MG/DL (ref 8.5–10.5)
CHLORIDE SERPL-SCNC: 112 MMOL/L (ref 96–112)
CHLORIDE SERPL-SCNC: 114 MMOL/L (ref 96–112)
CHLORIDE SERPL-SCNC: 114 MMOL/L (ref 96–112)
CO2 BLDA-SCNC: 18 MMOL/L (ref 20–33)
CO2 BLDA-SCNC: 19 MMOL/L (ref 20–33)
CO2 BLDA-SCNC: 20 MMOL/L (ref 20–33)
CO2 SERPL-SCNC: 17 MMOL/L (ref 20–33)
CO2 SERPL-SCNC: 22 MMOL/L (ref 20–33)
CO2 SERPL-SCNC: 24 MMOL/L (ref 20–33)
CREAT SERPL-MCNC: 1.26 MG/DL (ref 0.5–1.4)
CREAT SERPL-MCNC: 1.48 MG/DL (ref 0.5–1.4)
CREAT SERPL-MCNC: 1.53 MG/DL (ref 0.5–1.4)
EOSINOPHIL # BLD AUTO: 0 K/UL (ref 0–0.51)
EOSINOPHIL NFR BLD: 0 % (ref 0–6.9)
ERYTHROCYTE [DISTWIDTH] IN BLOOD BY AUTOMATED COUNT: 46 FL (ref 35.9–50)
GLOBULIN SER CALC-MCNC: 1.3 G/DL (ref 1.9–3.5)
GLUCOSE BLD-MCNC: 162 MG/DL (ref 65–99)
GLUCOSE BLD-MCNC: 167 MG/DL (ref 65–99)
GLUCOSE BLD-MCNC: 174 MG/DL (ref 65–99)
GLUCOSE BLD-MCNC: 181 MG/DL (ref 65–99)
GLUCOSE BLD-MCNC: 189 MG/DL (ref 65–99)
GLUCOSE SERPL-MCNC: 173 MG/DL (ref 65–99)
GLUCOSE SERPL-MCNC: 174 MG/DL (ref 65–99)
GLUCOSE SERPL-MCNC: 199 MG/DL (ref 65–99)
HCO3 BLDA-SCNC: 17.1 MMOL/L (ref 17–25)
HCO3 BLDA-SCNC: 18.2 MMOL/L (ref 17–25)
HCO3 BLDA-SCNC: 19.4 MMOL/L (ref 17–25)
HCT VFR BLD AUTO: 23.9 % (ref 42–52)
HCT VFR BLD CALC: 24 % (ref 42–52)
HCT VFR BLD CALC: 26 % (ref 42–52)
HGB BLD-MCNC: 6.5 G/DL (ref 14–18)
HGB BLD-MCNC: 7.2 G/DL (ref 14–18)
HGB BLD-MCNC: 8.2 G/DL (ref 14–18)
HGB BLD-MCNC: 8.3 G/DL (ref 14–18)
HGB BLD-MCNC: 8.8 G/DL (ref 14–18)
IMM GRANULOCYTES # BLD AUTO: 0.05 K/UL (ref 0–0.11)
IMM GRANULOCYTES NFR BLD AUTO: 0.4 % (ref 0–0.9)
INST. QUALIFIED PATIENT IIQPT: YES
LACTATE BLD-SCNC: 1.8 MMOL/L (ref 0.5–2)
LACTATE BLD-SCNC: 4.1 MMOL/L (ref 0.5–2)
LYMPHOCYTES # BLD AUTO: 1.85 K/UL (ref 1–4.8)
LYMPHOCYTES NFR BLD: 16.4 % (ref 22–41)
MCH RBC QN AUTO: 31.7 PG (ref 27–33)
MCHC RBC AUTO-ENTMCNC: 34.7 G/DL (ref 33.7–35.3)
MCV RBC AUTO: 91.2 FL (ref 81.4–97.8)
MONOCYTES # BLD AUTO: 1.51 K/UL (ref 0–0.85)
MONOCYTES NFR BLD AUTO: 13.4 % (ref 0–13.4)
NEUTROPHILS # BLD AUTO: 7.88 K/UL (ref 1.82–7.42)
NEUTROPHILS NFR BLD: 69.7 % (ref 44–72)
NRBC # BLD AUTO: 0 K/UL
NRBC BLD-RTO: 0 /100 WBC
O2/TOTAL GAS SETTING VFR VENT: 0.55 %
O2/TOTAL GAS SETTING VFR VENT: 0.6 %
O2/TOTAL GAS SETTING VFR VENT: 50 %
PCO2 BLDA: 28.8 MMHG (ref 26–37)
PCO2 BLDA: 38.5 MMHG (ref 26–37)
PCO2 BLDA: 46 MMHG (ref 26–37)
PCO2 TEMP ADJ BLDA: 29.8 MMHG (ref 26–37)
PCO2 TEMP ADJ BLDA: 40.1 MMHG (ref 26–37)
PCO2 TEMP ADJ BLDA: 47.2 MMHG (ref 26–37)
PH BLDA: 7.18 [PH] (ref 7.4–7.5)
PH BLDA: 7.28 [PH] (ref 7.4–7.5)
PH BLDA: 7.44 [PH] (ref 7.4–7.5)
PH TEMP ADJ BLDA: 7.17 [PH] (ref 7.4–7.5)
PH TEMP ADJ BLDA: 7.27 [PH] (ref 7.4–7.5)
PH TEMP ADJ BLDA: 7.42 [PH] (ref 7.4–7.5)
PLATELET # BLD AUTO: 114 K/UL (ref 164–446)
PMV BLD AUTO: 10.5 FL (ref 9–12.9)
PO2 BLDA: 125 MMHG (ref 64–87)
PO2 BLDA: 86 MMHG (ref 64–87)
PO2 BLDA: 99 MMHG (ref 64–87)
PO2 TEMP ADJ BLDA: 105 MMHG (ref 64–87)
PO2 TEMP ADJ BLDA: 129 MMHG (ref 64–87)
PO2 TEMP ADJ BLDA: 89 MMHG (ref 64–87)
POTASSIUM BLD-SCNC: 4.5 MMOL/L (ref 3.6–5.5)
POTASSIUM BLD-SCNC: 5.9 MMOL/L (ref 3.6–5.5)
POTASSIUM SERPL-SCNC: 4 MMOL/L (ref 3.6–5.5)
POTASSIUM SERPL-SCNC: 4.4 MMOL/L (ref 3.6–5.5)
POTASSIUM SERPL-SCNC: 5.2 MMOL/L (ref 3.6–5.5)
PROT SERPL-MCNC: 4.1 G/DL (ref 6–8.2)
RBC # BLD AUTO: 2.62 M/UL (ref 4.7–6.1)
SAO2 % BLDA: 94 % (ref 93–99)
SAO2 % BLDA: 97 % (ref 93–99)
SAO2 % BLDA: 99 % (ref 93–99)
SODIUM BLD-SCNC: 141 MMOL/L (ref 135–145)
SODIUM BLD-SCNC: 142 MMOL/L (ref 135–145)
SODIUM SERPL-SCNC: 143 MMOL/L (ref 135–145)
SODIUM SERPL-SCNC: 144 MMOL/L (ref 135–145)
SODIUM SERPL-SCNC: 145 MMOL/L (ref 135–145)
SPECIMEN DRAWN FROM PATIENT: ABNORMAL
WBC # BLD AUTO: 11.3 K/UL (ref 4.8–10.8)

## 2018-07-18 PROCEDURE — 94003 VENT MGMT INPAT SUBQ DAY: CPT

## 2018-07-18 PROCEDURE — 80048 BASIC METABOLIC PNL TOTAL CA: CPT

## 2018-07-18 PROCEDURE — 700101 HCHG RX REV CODE 250: Performed by: SURGERY

## 2018-07-18 PROCEDURE — 700111 HCHG RX REV CODE 636 W/ 250 OVERRIDE (IP): Performed by: SURGERY

## 2018-07-18 PROCEDURE — 700105 HCHG RX REV CODE 258: Performed by: SURGERY

## 2018-07-18 PROCEDURE — 71045 X-RAY EXAM CHEST 1 VIEW: CPT

## 2018-07-18 PROCEDURE — 82962 GLUCOSE BLOOD TEST: CPT | Mod: 91

## 2018-07-18 PROCEDURE — 770022 HCHG ROOM/CARE - ICU (200)

## 2018-07-18 PROCEDURE — 36430 TRANSFUSION BLD/BLD COMPNT: CPT

## 2018-07-18 PROCEDURE — P9016 RBC LEUKOCYTES REDUCED: HCPCS

## 2018-07-18 PROCEDURE — 72125 CT NECK SPINE W/O DYE: CPT

## 2018-07-18 PROCEDURE — 99291 CRITICAL CARE FIRST HOUR: CPT | Performed by: SURGERY

## 2018-07-18 PROCEDURE — 85018 HEMOGLOBIN: CPT | Mod: 91

## 2018-07-18 PROCEDURE — 86923 COMPATIBILITY TEST ELECTRIC: CPT

## 2018-07-18 PROCEDURE — 83605 ASSAY OF LACTIC ACID: CPT | Mod: 91

## 2018-07-18 PROCEDURE — 700111 HCHG RX REV CODE 636 W/ 250 OVERRIDE (IP): Performed by: PHYSICIAN ASSISTANT

## 2018-07-18 PROCEDURE — 94150 VITAL CAPACITY TEST: CPT

## 2018-07-18 PROCEDURE — 72131 CT LUMBAR SPINE W/O DYE: CPT

## 2018-07-18 PROCEDURE — 82803 BLOOD GASES ANY COMBINATION: CPT

## 2018-07-18 PROCEDURE — 85025 COMPLETE CBC W/AUTO DIFF WBC: CPT

## 2018-07-18 PROCEDURE — 72128 CT CHEST SPINE W/O DYE: CPT

## 2018-07-18 PROCEDURE — 302136 NUTRITION PUMP: Performed by: SURGERY

## 2018-07-18 PROCEDURE — 80053 COMPREHEN METABOLIC PANEL: CPT

## 2018-07-18 RX ORDER — BISACODYL 10 MG
10 SUPPOSITORY, RECTAL RECTAL
Status: DISCONTINUED | OUTPATIENT
Start: 2018-07-18 | End: 2018-07-25

## 2018-07-18 RX ORDER — AMOXICILLIN 250 MG
1 CAPSULE ORAL NIGHTLY
Status: DISCONTINUED | OUTPATIENT
Start: 2018-07-18 | End: 2018-07-31 | Stop reason: HOSPADM

## 2018-07-18 RX ORDER — PROMETHAZINE HYDROCHLORIDE 25 MG/1
12.5-25 TABLET ORAL EVERY 4 HOURS PRN
Status: DISCONTINUED | OUTPATIENT
Start: 2018-07-18 | End: 2018-07-25

## 2018-07-18 RX ORDER — DEXAMETHASONE SODIUM PHOSPHATE 4 MG/ML
4 INJECTION, SOLUTION INTRA-ARTICULAR; INTRALESIONAL; INTRAMUSCULAR; INTRAVENOUS; SOFT TISSUE EVERY 6 HOURS
Status: COMPLETED | OUTPATIENT
Start: 2018-07-18 | End: 2018-07-18

## 2018-07-18 RX ORDER — SODIUM CHLORIDE, SODIUM LACTATE, POTASSIUM CHLORIDE, CALCIUM CHLORIDE 600; 310; 30; 20 MG/100ML; MG/100ML; MG/100ML; MG/100ML
INJECTION, SOLUTION INTRAVENOUS CONTINUOUS
Status: DISCONTINUED | OUTPATIENT
Start: 2018-07-18 | End: 2018-07-25

## 2018-07-18 RX ORDER — MIDODRINE HYDROCHLORIDE 5 MG/1
10 TABLET ORAL 3 TIMES DAILY
Status: DISCONTINUED | OUTPATIENT
Start: 2018-07-18 | End: 2018-07-31 | Stop reason: HOSPADM

## 2018-07-18 RX ORDER — ONDANSETRON 4 MG/1
4 TABLET, ORALLY DISINTEGRATING ORAL EVERY 4 HOURS PRN
Status: DISCONTINUED | OUTPATIENT
Start: 2018-07-18 | End: 2018-07-31 | Stop reason: HOSPADM

## 2018-07-18 RX ORDER — MORPHINE SULFATE 4 MG/ML
2-4 INJECTION, SOLUTION INTRAMUSCULAR; INTRAVENOUS
Status: DISCONTINUED | OUTPATIENT
Start: 2018-07-18 | End: 2018-07-18

## 2018-07-18 RX ORDER — DOCUSATE SODIUM 100 MG/1
100 CAPSULE, LIQUID FILLED ORAL 2 TIMES DAILY
Status: DISCONTINUED | OUTPATIENT
Start: 2018-07-18 | End: 2018-07-18

## 2018-07-18 RX ORDER — DEXTROSE MONOHYDRATE, SODIUM CHLORIDE, AND POTASSIUM CHLORIDE 50; 1.49; 4.5 G/1000ML; G/1000ML; G/1000ML
INJECTION, SOLUTION INTRAVENOUS CONTINUOUS
Status: DISCONTINUED | OUTPATIENT
Start: 2018-07-18 | End: 2018-07-18

## 2018-07-18 RX ORDER — OXYCODONE HYDROCHLORIDE 5 MG/1
5 TABLET ORAL
Status: DISCONTINUED | OUTPATIENT
Start: 2018-07-18 | End: 2018-07-18

## 2018-07-18 RX ORDER — DIPHENHYDRAMINE HYDROCHLORIDE 50 MG/ML
25 INJECTION INTRAMUSCULAR; INTRAVENOUS EVERY 6 HOURS PRN
Status: DISCONTINUED | OUTPATIENT
Start: 2018-07-18 | End: 2018-07-25

## 2018-07-18 RX ORDER — CALCIUM CARBONATE 500 MG/1
500 TABLET, CHEWABLE ORAL 2 TIMES DAILY
Status: DISCONTINUED | OUTPATIENT
Start: 2018-07-18 | End: 2018-07-18

## 2018-07-18 RX ORDER — CEFAZOLIN SODIUM 2 G/100ML
2 INJECTION, SOLUTION INTRAVENOUS EVERY 8 HOURS
Status: COMPLETED | OUTPATIENT
Start: 2018-07-18 | End: 2018-07-18

## 2018-07-18 RX ORDER — PROMETHAZINE HYDROCHLORIDE 25 MG/1
12.5-25 SUPPOSITORY RECTAL EVERY 4 HOURS PRN
Status: DISCONTINUED | OUTPATIENT
Start: 2018-07-18 | End: 2018-07-25

## 2018-07-18 RX ORDER — POLYETHYLENE GLYCOL 3350 17 G/17G
1 POWDER, FOR SOLUTION ORAL 2 TIMES DAILY PRN
Status: DISCONTINUED | OUTPATIENT
Start: 2018-07-18 | End: 2018-07-31 | Stop reason: HOSPADM

## 2018-07-18 RX ORDER — ONDANSETRON 2 MG/ML
4 INJECTION INTRAMUSCULAR; INTRAVENOUS EVERY 4 HOURS PRN
Status: DISCONTINUED | OUTPATIENT
Start: 2018-07-18 | End: 2018-07-25

## 2018-07-18 RX ORDER — ENEMA 19; 7 G/133ML; G/133ML
1 ENEMA RECTAL
Status: DISCONTINUED | OUTPATIENT
Start: 2018-07-18 | End: 2018-07-31 | Stop reason: HOSPADM

## 2018-07-18 RX ORDER — METHOCARBAMOL 750 MG/1
750 TABLET, FILM COATED ORAL EVERY 8 HOURS PRN
Status: DISCONTINUED | OUTPATIENT
Start: 2018-07-18 | End: 2018-07-18

## 2018-07-18 RX ORDER — AMOXICILLIN 250 MG
1 CAPSULE ORAL
Status: DISCONTINUED | OUTPATIENT
Start: 2018-07-18 | End: 2018-07-31 | Stop reason: HOSPADM

## 2018-07-18 RX ORDER — DIPHENHYDRAMINE HCL 25 MG
25 TABLET ORAL EVERY 6 HOURS PRN
Status: DISCONTINUED | OUTPATIENT
Start: 2018-07-18 | End: 2018-07-25

## 2018-07-18 RX ADMIN — Medication 1 EACH: at 17:36

## 2018-07-18 RX ADMIN — DEXAMETHASONE SODIUM PHOSPHATE 4 MG: 4 INJECTION, SOLUTION INTRAMUSCULAR; INTRAVENOUS at 11:42

## 2018-07-18 RX ADMIN — INSULIN HUMAN 2 UNITS: 100 INJECTION, SOLUTION PARENTERAL at 17:50

## 2018-07-18 RX ADMIN — INSULIN HUMAN 2 UNITS: 100 INJECTION, SOLUTION PARENTERAL at 11:42

## 2018-07-18 RX ADMIN — SODIUM CHLORIDE, POTASSIUM CHLORIDE, SODIUM LACTATE AND CALCIUM CHLORIDE: 600; 310; 30; 20 INJECTION, SOLUTION INTRAVENOUS at 13:36

## 2018-07-18 RX ADMIN — NOREPINEPHRINE BITARTRATE 25 MCG/MIN: 1 INJECTION INTRAVENOUS at 02:01

## 2018-07-18 RX ADMIN — PROPOFOL 15 MCG/KG/MIN: 10 INJECTION, EMULSION INTRAVENOUS at 18:15

## 2018-07-18 RX ADMIN — Medication 1 EACH: at 04:59

## 2018-07-18 RX ADMIN — DEXAMETHASONE SODIUM PHOSPHATE 4 MG: 4 INJECTION, SOLUTION INTRAMUSCULAR; INTRAVENOUS at 17:36

## 2018-07-18 RX ADMIN — CEFAZOLIN SODIUM 2 G: 2 INJECTION, SOLUTION INTRAVENOUS at 09:04

## 2018-07-18 RX ADMIN — FAMOTIDINE 20 MG: 10 INJECTION, SOLUTION INTRAVENOUS at 04:59

## 2018-07-18 RX ADMIN — SODIUM CHLORIDE, POTASSIUM CHLORIDE, SODIUM LACTATE AND CALCIUM CHLORIDE: 600; 310; 30; 20 INJECTION, SOLUTION INTRAVENOUS at 08:00

## 2018-07-18 RX ADMIN — PROPOFOL 15 MCG/KG/MIN: 10 INJECTION, EMULSION INTRAVENOUS at 10:20

## 2018-07-18 RX ADMIN — DEXAMETHASONE SODIUM PHOSPHATE 4 MG: 4 INJECTION, SOLUTION INTRAMUSCULAR; INTRAVENOUS at 09:05

## 2018-07-18 RX ADMIN — PROPOFOL 15 MCG/KG/MIN: 10 INJECTION, EMULSION INTRAVENOUS at 02:02

## 2018-07-18 RX ADMIN — PHENYLEPHRINE HYDROCHLORIDE 70 MCG/MIN: 10 INJECTION INTRAVENOUS at 02:01

## 2018-07-18 RX ADMIN — FAMOTIDINE 20 MG: 10 INJECTION, SOLUTION INTRAVENOUS at 17:36

## 2018-07-18 RX ADMIN — NOREPINEPHRINE BITARTRATE 5 MCG/MIN: 1 INJECTION INTRAVENOUS at 17:49

## 2018-07-18 RX ADMIN — NOREPINEPHRINE BITARTRATE 23 MCG/MIN: 1 INJECTION INTRAVENOUS at 07:45

## 2018-07-18 RX ADMIN — INSULIN HUMAN 2 UNITS: 100 INJECTION, SOLUTION PARENTERAL at 05:08

## 2018-07-18 RX ADMIN — CEFAZOLIN SODIUM 2 G: 2 INJECTION, SOLUTION INTRAVENOUS at 16:23

## 2018-07-18 ASSESSMENT — PULMONARY FUNCTION TESTS: FVC: 1750

## 2018-07-18 NOTE — CARE PLAN
Problem: Lumbar/thoracic spine surgery  Goal: Post Operative care of the Lumbar/Thoracic Spine surgery patient    Intervention: Follow spinal precautions with all movement  Patient still strict log roll pending further lumbar spine stabilization.  Aspen collar in place at all times.  Patient is in reverse trendelenburg head of bed ok to 30 degrees.       Problem: Neuro Status  Goal: Monitor neuro status and rapid identification of neuro changes  Neuro checks every four hours per MD.  Patient following commands on upper extremities; will notify MD of any changes.
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
## 2018-07-18 NOTE — PROGRESS NOTES
Pt to S103 at this time    Pt is transported on monitor with RN and anesthesiologist.      / 75  RR 17  o2 100% on 70% fio2  Temp 101.1    Wt: 113.0kg    2 RN skin check,  Pt has generalized bruising and abrasions, surgical incisions have slight drainage, but are otherwise CDI. There is an area of ecchymosis on pts left buttock, midline.     Pt given CHG bath at this time.

## 2018-07-18 NOTE — PROGRESS NOTES
Reba from Lab called with critical result of lactic acid at 6.0. Critical lab result read back to Reba.   MANDY Zendejas notified of critical lab result at 0000.  Repeat lactic with AM labs

## 2018-07-18 NOTE — PROGRESS NOTES
Cortrak Placement    Tube Team verified patient name and medical record number prior to tube placement.  Cortrak tube (55 inches, 10 [place] Islander) placed at 95 cm in right nare.  Per Cortrak picture, tube appears to be in the small bowel.  Nursing Instructions: Awaiting KUB to confirm placement before use for medications or feeding. Once placement confirmed, flush tube with 30 ml of water, and then remove and save stylet, in patient medication drawer.
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
## 2018-07-18 NOTE — PROGRESS NOTES
Ti RAMOS at bedside. Discussed plan of care. Wants to continue with log rolling, but okay to do Q4 neuro checks.

## 2018-07-18 NOTE — PROGRESS NOTES
Neurosurgery Progress Note    Subjective:  POD#1 C6-7 360 degree fusion with T12 - L4 laminectomy and fusion.   No changes overnight.    Exam:  Awake and alert. Intubated.  Follows commands on uppers. Moves both upper extremities well. 0/5 to bilateral lowers. No sensation to bilateral lowers.     BP  Min: 104/80  Max: 123/86  Pulse  Av.6  Min: 71  Max: 126  Resp  Av.6  Min: 6  Max: 25  Temp  Av.4 °C (97.6 °F)  Min: 34.4 °C (94 °F)  Max: 38.4 °C (101.1 °F)  Monitored Temp 2  Av.2 °C (99 °F)  Min: 35.2 °C (95.4 °F)  Max: 38.3 °C (100.9 °F)  SpO2  Av.7 %  Min: 99 %  Max: 100 %    No Data Recorded    Recent Labs      18   0846  18   1118  18   2330  18   0500   WBC  18.6*  21.0*   --   11.3*   RBC  4.37*  2.83*   --   2.62*   HEMOGLOBIN  13.7*  9.0*  9.2*  8.3*   HEMATOCRIT  41.6*  26.9*   --   23.9*   MCV  95.2  95.1   --   91.2   MCH  31.4  31.8   --   31.7   MCHC  32.9*  33.5*   --   34.7   RDW  44.6  43.5   --   46.0   PLATELETCT  228  160*   --   114*   MPV  10.1  10.0   --   10.5     Recent Labs      18   1118  18   2330  18   0500   SODIUM  142  144  145   POTASSIUM  3.6  5.2  4.4   CHLORIDE  118*  112  114*   CO2  16*  17*  22   GLUCOSE  185*  199*  174*   BUN    17   CREATININE  1.16  1.53*  1.48*   CALCIUM  6.4*  7.7*  7.4*     Recent Labs      18   0936   APTT  31.1   INR  1.27*     Recent Labs      18   0846   REACTMIN  5.6   CLOTKINET  1.8   CLOTANGL  65.0   MAXCLOTS  61.3   [id number]  4.5   PRCINADP  see comment   PRCINAA  see comment       Intake/Output       18 07 - 18 0659 18 - 18 0659      [id number] Total  Total       Intake    I.V.  6934.2  9660.4 18733.6  --  -- --    Crystalloid Intake -- 5500 5500 -- -- --    Other Colloid Intake -- 1000 1000 -- -- --    Pre-Hospital Volume 200 -- 200 -- -- --    Trauma Resuscitation Volume 100 -- 100 -- -- --     Phenylephrine Volume 72.9 624.5 697.4 -- -- --    Propofol Volume 43.4 156.8 200.2 -- -- --    Norepinephrine Volume 165.9 375.2 541.1 -- -- --    Ancef 100 -- 100 -- -- --    IV Volume (LR Bolus) 2000 -- 2000 -- -- --    IV Volume (NS Bolus) 3000 -- 3000 -- -- --    IV Volume (Maintence Fluid) 1250 2000 3250 -- -- --    IV Volume (Fentanyl) 2 4 6 -- -- --    Blood  0  -- 0  --  -- --    PRBC Total Volume (Non-Barcoded) 0 -- 0 -- -- --    FFP Total Volume (Non-Barcoded) 0 -- 0 -- -- --    Platelets Total Volume (Non-Barcoded) 0 -- 0 -- -- --    Cryoprecipitate (Pooled) Total Volume (Non-Barcoded) 0 -- 0 -- -- --    Cryoprecipitate (Single) Total Volume (Non-Barcoded) 0 -- 0 -- -- --    Total Intake 6934.2 9660.4 48415.6 -- -- --       Output    Urine  2085 2000 4085  --  -- --    Output (mL) (Urinary Catheter Indwelling Catheter) 2085 2000 4085 -- -- --    Drains  --  410 410  --  -- --    Output (ml) (Surgical Drain Group Neck Allen Aquino 1 (A)) -- 100 100 -- -- --    Output (ml) (Surgical Drain Group Neck Allen Aquino 2 (B)) -- 30 30 -- -- --    Output (ml) (Surgical Drain Group Back Allen Aquino 3 (C)) -- 280 280 -- -- --    Other  100  -- 100  --  -- --    Pre-Hospital Output 0 -- 0 -- -- --    Trauma Resuscitation Output 100 -- 100 -- -- --    Stool  0  -- 0  --  -- --    Number of Times Stooled 0 x -- 0 x -- -- --    Measurable Stool (mL) 0 -- 0 -- -- --    Emesis/NG output  --  50 50  --  -- --    Output (mL) (Enteral Tube Oral Oral Gastric) -- 50 50 -- -- --    Blood  0  550 550  --  -- --    Est. Blood Loss (mL) 0 550 550 -- -- --    Total Output 2185 3010 5195 -- -- --       Net I/O     4749.2 6650.4 71441.6 -- -- --            Intake/Output Summary (Last 24 hours) at 07/18/18 0824  Last data filed at 07/18/18 0600   Gross per 24 hour   Intake         61812.62 ml   Output             5195 ml   Net         50727.62 ml            • Pharmacy Consult Request  1 Each PRN   • MD ALERT...Do not administer  NSAIDS or ASPIRIN unless ORDERED By Neurosurgery  1 Each PRN   • senna-docusate  1 Tab Nightly   • senna-docusate  1 Tab Q24HRS PRN   • polyethylene glycol/lytes  1 Packet BID PRN   • magnesium hydroxide  30 mL QDAY PRN   • bisacodyl  10 mg Q24HRS PRN   • fleet  1 Each Once PRN   • dextrose 5 % and 0.45 % NaCl with KCl 20 mEq   Continuous   • ceFAZolin  2 g Q8HR   • diphenhydrAMINE  25 mg Q6HRS PRN    Or   • diphenhydrAMINE  25 mg Q6HRS PRN   • ondansetron  4 mg Q4HRS PRN   • ondansetron  4 mg Q4HRS PRN   • promethazine  12.5-25 mg Q4HRS PRN   • promethazine  12.5-25 mg Q4HRS PRN   • prochlorperazine  5-10 mg Q4HRS PRN   • dexamethasone  4 mg Q6HRS   • methocarbamol  750 mg Q8HRS PRN   • calcium carbonate  500 mg BID   • benzocaine-menthol  1 Lozenge Q2HRS PRN   • Respiratory Care per Protocol   Continuous RT   • docusate sodium 100mg/10mL  100 mg BID   • acetaminophen  650 mg Q4HRS PRN    Or   • acetaminophen  650 mg Q4HRS PRN   • insulin regular  2-9 Units Q6HRS    And   • glucose 4 g  16 g Q15 MIN PRN    And   • dextrose 50%  25 mL Q15 MIN PRN   • NORepinephrine (LEVOPHED) infusion  0-30 mcg/min Continuous   • propofol  0-80 mcg/kg/min Continuous   • Phenylephrine infusion  0-300 mcg/min Continuous   • fentaNYL   Continuous   • fentaNYL  25 mcg Q HOUR PRN    Or   • fentaNYL  50 mcg Q HOUR PRN    Or   • fentaNYL  100 mcg Q HOUR PRN   • bacitracin-polymyxin b   BID   • famotidine  20 mg BID    Or   • famotidine  20 mg BID   • chlorhexidine  15 mL BID       Assessment and Plan:  Hospital day #2  POD #1 C6-7 360 degree fusion, T12 - L4 laminectomy and fusion. Needs L2 corpectomy. Continue log roll until then.

## 2018-07-18 NOTE — CARE PLAN
Problem: Ventilation Defect:  Goal: Ability to achieve and maintain unassisted ventilation or tolerate decreased levels of ventilator support  Adult Ventilation Update    Total Vent Days: 2    Patient Lines/Drains/Airways Status    Active Airway     Name: Placement date: Placement time: Site: Days:    Airway Group ET Tube 8.0 07/17/18   0847      less than 1              Patient has a cervical fracture surgery done tonight. Patient was on CMV settting but was switched to ASV due to breath stacking and patient doing spontaneous breath. Patient was comfortable with , +8, 50%. NO SBT has been done. Will do SBT today.
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
## 2018-07-18 NOTE — CARE PLAN
Problem: Pain Management  Goal: Pain level will decrease to patient's comfort goal  Outcome: PROGRESSING AS EXPECTED  Pt pain assessed using appropriate pain scale, CPOT and treated per MAR. Non-pharm interventions in place such as re-positioning and temperature therapy and mobilization      Problem: Skin Integrity  Goal: Risk for impaired skin integrity will decrease  Outcome: PROGRESSING AS EXPECTED  Pt on q2hr turn schedule, skin checked, linens changed PRN
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
## 2018-07-18 NOTE — PROGRESS NOTES
Clarified with Levi RAMOS for neurosurgery that patient has not been cleared for orthopedic surgery yet at this time.  Ortho paged and notified of plan of care.

## 2018-07-18 NOTE — PROGRESS NOTES
Applied 15 lb skeletal traction to patients LLE, positive cms pre and post application  If you have any further questions please call traction at [phone]

## 2018-07-18 NOTE — PROGRESS NOTES
Trauma/Surgery Progress Note    Pt back from surgery  On levo/kb gtts  Making urine  Serial hgb and repeat lactic acid ordered    Per neuro, spine is still unstable and will need anterior approach, continue log roll precaution and HOB not to exceed 30 degrees

## 2018-07-18 NOTE — PROGRESS NOTES
Updated PA Louie Chiang on pt's neuro exam/status (following and moving in BLUEs, flaccid and no sensation on BLLEs).    No new orders at this time

## 2018-07-19 ENCOUNTER — APPOINTMENT (OUTPATIENT)
Dept: RADIOLOGY | Facility: MEDICAL CENTER | Age: 58
DRG: 956 | End: 2018-07-19
Attending: SURGERY
Payer: COMMERCIAL

## 2018-07-19 ENCOUNTER — APPOINTMENT (OUTPATIENT)
Dept: RADIOLOGY | Facility: MEDICAL CENTER | Age: 58
DRG: 956 | End: 2018-07-19
Attending: ORTHOPAEDIC SURGERY
Payer: COMMERCIAL

## 2018-07-19 PROBLEM — D64.9 ANEMIA: Status: ACTIVE | Noted: 2018-07-19

## 2018-07-19 PROBLEM — Z51.81 INADEQUATE ANTICOAGULATION: Status: ACTIVE | Noted: 2018-07-19

## 2018-07-19 PROBLEM — Z79.01 INADEQUATE ANTICOAGULATION: Status: ACTIVE | Noted: 2018-07-19

## 2018-07-19 PROBLEM — R13.12 OROPHARYNGEAL DYSPHAGIA: Status: ACTIVE | Noted: 2018-07-19

## 2018-07-19 PROBLEM — E83.39 HYPOPHOSPHATEMIA: Status: ACTIVE | Noted: 2018-07-19

## 2018-07-19 LAB
ALBUMIN SERPL BCP-MCNC: 2.5 G/DL (ref 3.2–4.9)
ALBUMIN/GLOB SERPL: 1.6 G/DL
ALP SERPL-CCNC: 31 U/L (ref 30–99)
ALT SERPL-CCNC: 30 U/L (ref 2–50)
ANION GAP SERPL CALC-SCNC: 4 MMOL/L (ref 0–11.9)
AST SERPL-CCNC: 67 U/L (ref 12–45)
BASOPHILS # BLD AUTO: 0.1 % (ref 0–1.8)
BASOPHILS # BLD AUTO: 0.1 % (ref 0–1.8)
BASOPHILS # BLD: 0.01 K/UL (ref 0–0.12)
BASOPHILS # BLD: 0.01 K/UL (ref 0–0.12)
BILIRUB SERPL-MCNC: 0.7 MG/DL (ref 0.1–1.5)
BUN SERPL-MCNC: 17 MG/DL (ref 8–22)
CALCIUM SERPL-MCNC: 7.2 MG/DL (ref 8.5–10.5)
CHLORIDE SERPL-SCNC: 113 MMOL/L (ref 96–112)
CO2 SERPL-SCNC: 23 MMOL/L (ref 20–33)
CREAT SERPL-MCNC: 1.05 MG/DL (ref 0.5–1.4)
EOSINOPHIL # BLD AUTO: 0 K/UL (ref 0–0.51)
EOSINOPHIL # BLD AUTO: 0 K/UL (ref 0–0.51)
EOSINOPHIL NFR BLD: 0 % (ref 0–6.9)
EOSINOPHIL NFR BLD: 0 % (ref 0–6.9)
ERYTHROCYTE [DISTWIDTH] IN BLOOD BY AUTOMATED COUNT: 55.8 FL (ref 35.9–50)
ERYTHROCYTE [DISTWIDTH] IN BLOOD BY AUTOMATED COUNT: 56.8 FL (ref 35.9–50)
GLOBULIN SER CALC-MCNC: 1.6 G/DL (ref 1.9–3.5)
GLUCOSE BLD-MCNC: 119 MG/DL (ref 65–99)
GLUCOSE BLD-MCNC: 131 MG/DL (ref 65–99)
GLUCOSE BLD-MCNC: 131 MG/DL (ref 65–99)
GLUCOSE BLD-MCNC: 146 MG/DL (ref 65–99)
GLUCOSE SERPL-MCNC: 133 MG/DL (ref 65–99)
HCT VFR BLD AUTO: 21.9 % (ref 42–52)
HCT VFR BLD AUTO: 24 % (ref 42–52)
HGB BLD-MCNC: 6.8 G/DL (ref 14–18)
HGB BLD-MCNC: 6.8 G/DL (ref 14–18)
HGB BLD-MCNC: 7.6 G/DL (ref 14–18)
HGB BLD-MCNC: 8.4 G/DL (ref 14–18)
IMM GRANULOCYTES # BLD AUTO: 0.06 K/UL (ref 0–0.11)
IMM GRANULOCYTES # BLD AUTO: 0.06 K/UL (ref 0–0.11)
IMM GRANULOCYTES NFR BLD AUTO: 0.5 % (ref 0–0.9)
IMM GRANULOCYTES NFR BLD AUTO: 0.6 % (ref 0–0.9)
LACTATE BLD-SCNC: 1.3 MMOL/L (ref 0.5–2)
LYMPHOCYTES # BLD AUTO: 0.73 K/UL (ref 1–4.8)
LYMPHOCYTES # BLD AUTO: 1.02 K/UL (ref 1–4.8)
LYMPHOCYTES NFR BLD: 7.1 % (ref 22–41)
LYMPHOCYTES NFR BLD: 8.8 % (ref 22–41)
MAGNESIUM SERPL-MCNC: 1.8 MG/DL (ref 1.5–2.5)
MCH RBC QN AUTO: 30.2 PG (ref 27–33)
MCH RBC QN AUTO: 30.4 PG (ref 27–33)
MCHC RBC AUTO-ENTMCNC: 34.7 G/DL (ref 33.7–35.3)
MCHC RBC AUTO-ENTMCNC: 35 G/DL (ref 33.7–35.3)
MCV RBC AUTO: 86.9 FL (ref 81.4–97.8)
MCV RBC AUTO: 87 FL (ref 81.4–97.8)
MONOCYTES # BLD AUTO: 0.85 K/UL (ref 0–0.85)
MONOCYTES # BLD AUTO: 1.31 K/UL (ref 0–0.85)
MONOCYTES NFR BLD AUTO: 11.4 % (ref 0–13.4)
MONOCYTES NFR BLD AUTO: 8.3 % (ref 0–13.4)
NEUTROPHILS # BLD AUTO: 8.58 K/UL (ref 1.82–7.42)
NEUTROPHILS # BLD AUTO: 9.13 K/UL (ref 1.82–7.42)
NEUTROPHILS NFR BLD: 79.2 % (ref 44–72)
NEUTROPHILS NFR BLD: 83.9 % (ref 44–72)
NRBC # BLD AUTO: 0 K/UL
NRBC # BLD AUTO: 0 K/UL
NRBC BLD-RTO: 0 /100 WBC
NRBC BLD-RTO: 0 /100 WBC
PHOSPHATE SERPL-MCNC: 2 MG/DL (ref 2.5–4.5)
PLATELET # BLD AUTO: 71 K/UL (ref 164–446)
PLATELET # BLD AUTO: 80 K/UL (ref 164–446)
PMV BLD AUTO: 10.6 FL (ref 9–12.9)
PMV BLD AUTO: 11.2 FL (ref 9–12.9)
POTASSIUM SERPL-SCNC: 3.8 MMOL/L (ref 3.6–5.5)
PROT SERPL-MCNC: 4.1 G/DL (ref 6–8.2)
RBC # BLD AUTO: 2.52 M/UL (ref 4.7–6.1)
RBC # BLD AUTO: 2.76 M/UL (ref 4.7–6.1)
SODIUM SERPL-SCNC: 140 MMOL/L (ref 135–145)
TRIGL SERPL-MCNC: 302 MG/DL (ref 0–149)
WBC # BLD AUTO: 10.2 K/UL (ref 4.8–10.8)
WBC # BLD AUTO: 11.5 K/UL (ref 4.8–10.8)

## 2018-07-19 PROCEDURE — 85025 COMPLETE CBC W/AUTO DIFF WBC: CPT

## 2018-07-19 PROCEDURE — 160009 HCHG ANES TIME/MIN: Performed by: ORTHOPAEDIC SURGERY

## 2018-07-19 PROCEDURE — 36430 TRANSFUSION BLD/BLD COMPNT: CPT

## 2018-07-19 PROCEDURE — 700101 HCHG RX REV CODE 250: Performed by: SURGERY

## 2018-07-19 PROCEDURE — 83735 ASSAY OF MAGNESIUM: CPT

## 2018-07-19 PROCEDURE — 0QS936Z REPOSITION LEFT FEMORAL SHAFT WITH INTRAMEDULLARY INTERNAL FIXATION DEVICE, PERCUTANEOUS APPROACH: ICD-10-PCS | Performed by: ORTHOPAEDIC SURGERY

## 2018-07-19 PROCEDURE — 700111 HCHG RX REV CODE 636 W/ 250 OVERRIDE (IP): Performed by: SURGERY

## 2018-07-19 PROCEDURE — 700105 HCHG RX REV CODE 258: Performed by: SURGERY

## 2018-07-19 PROCEDURE — 501838 HCHG SUTURE GENERAL: Performed by: ORTHOPAEDIC SURGERY

## 2018-07-19 PROCEDURE — A9270 NON-COVERED ITEM OR SERVICE: HCPCS | Performed by: SURGERY

## 2018-07-19 PROCEDURE — 700102 HCHG RX REV CODE 250 W/ 637 OVERRIDE(OP): Performed by: SURGERY

## 2018-07-19 PROCEDURE — 700101 HCHG RX REV CODE 250

## 2018-07-19 PROCEDURE — 71045 X-RAY EXAM CHEST 1 VIEW: CPT

## 2018-07-19 PROCEDURE — 770022 HCHG ROOM/CARE - ICU (200)

## 2018-07-19 PROCEDURE — 160048 HCHG OR STATISTICAL LEVEL 1-5: Performed by: ORTHOPAEDIC SURGERY

## 2018-07-19 PROCEDURE — 80053 COMPREHEN METABOLIC PANEL: CPT

## 2018-07-19 PROCEDURE — 83605 ASSAY OF LACTIC ACID: CPT

## 2018-07-19 PROCEDURE — 700111 HCHG RX REV CODE 636 W/ 250 OVERRIDE (IP): Performed by: ORTHOPAEDIC SURGERY

## 2018-07-19 PROCEDURE — 85018 HEMOGLOBIN: CPT

## 2018-07-19 PROCEDURE — 84100 ASSAY OF PHOSPHORUS: CPT

## 2018-07-19 PROCEDURE — 160029 HCHG SURGERY MINUTES - 1ST 30 MINS LEVEL 4: Performed by: ORTHOPAEDIC SURGERY

## 2018-07-19 PROCEDURE — 160041 HCHG SURGERY MINUTES - EA ADDL 1 MIN LEVEL 4: Performed by: ORTHOPAEDIC SURGERY

## 2018-07-19 PROCEDURE — 94150 VITAL CAPACITY TEST: CPT

## 2018-07-19 PROCEDURE — 94003 VENT MGMT INPAT SUBQ DAY: CPT

## 2018-07-19 PROCEDURE — 86923 COMPATIBILITY TEST ELECTRIC: CPT | Mod: 91

## 2018-07-19 PROCEDURE — 110371 HCHG SHELL REV 272: Performed by: ORTHOPAEDIC SURGERY

## 2018-07-19 PROCEDURE — 700102 HCHG RX REV CODE 250 W/ 637 OVERRIDE(OP): Performed by: PHYSICIAN ASSISTANT

## 2018-07-19 PROCEDURE — 73552 X-RAY EXAM OF FEMUR 2/>: CPT | Mod: LT

## 2018-07-19 PROCEDURE — 99291 CRITICAL CARE FIRST HOUR: CPT | Performed by: SURGERY

## 2018-07-19 PROCEDURE — C1713 ANCHOR/SCREW BN/BN,TIS/BN: HCPCS | Performed by: ORTHOPAEDIC SURGERY

## 2018-07-19 PROCEDURE — 84478 ASSAY OF TRIGLYCERIDES: CPT

## 2018-07-19 PROCEDURE — 82962 GLUCOSE BLOOD TEST: CPT | Mod: 91

## 2018-07-19 PROCEDURE — P9016 RBC LEUKOCYTES REDUCED: HCPCS | Mod: 91

## 2018-07-19 PROCEDURE — 500891 HCHG PACK, ORTHO MAJOR: Performed by: ORTHOPAEDIC SURGERY

## 2018-07-19 PROCEDURE — A9270 NON-COVERED ITEM OR SERVICE: HCPCS | Performed by: PHYSICIAN ASSISTANT

## 2018-07-19 PROCEDURE — A6402 STERILE GAUZE <= 16 SQ IN: HCPCS | Performed by: ORTHOPAEDIC SURGERY

## 2018-07-19 PROCEDURE — 700111 HCHG RX REV CODE 636 W/ 250 OVERRIDE (IP)

## 2018-07-19 PROCEDURE — 700112 HCHG RX REV CODE 229: Performed by: SURGERY

## 2018-07-19 DEVICE — SCREW CROSS LOCK 5MM X 42.5MM (4TX5=20): Type: IMPLANTABLE DEVICE | Site: LEG | Status: FUNCTIONAL

## 2018-07-19 DEVICE — IMPLANTABLE DEVICE: Type: IMPLANTABLE DEVICE | Site: LEG | Status: FUNCTIONAL

## 2018-07-19 DEVICE — SCREW CROSS LOCK 5MM X 60MM (4TX5=20): Type: IMPLANTABLE DEVICE | Site: LEG | Status: FUNCTIONAL

## 2018-07-19 DEVICE — SCREW CROSS LOCK 5MM X 55MM (4TX5=20): Type: IMPLANTABLE DEVICE | Site: LEG | Status: FUNCTIONAL

## 2018-07-19 DEVICE — SCREW CROSS LOCK 5MM X 47.5MM (4TX5=20): Type: IMPLANTABLE DEVICE | Site: LEG | Status: FUNCTIONAL

## 2018-07-19 RX ORDER — QUETIAPINE FUMARATE 25 MG/1
25 TABLET, FILM COATED ORAL
COMMUNITY

## 2018-07-19 RX ORDER — SERTRALINE HYDROCHLORIDE 100 MG/1
100 TABLET, FILM COATED ORAL DAILY
COMMUNITY

## 2018-07-19 RX ORDER — SERTRALINE HYDROCHLORIDE 100 MG/1
100 TABLET, FILM COATED ORAL DAILY
Status: DISCONTINUED | OUTPATIENT
Start: 2018-07-19 | End: 2018-07-31 | Stop reason: HOSPADM

## 2018-07-19 RX ADMIN — PROPOFOL 20 MCG/KG/MIN: 10 INJECTION, EMULSION INTRAVENOUS at 02:53

## 2018-07-19 RX ADMIN — PROPOFOL 20 MCG/KG/MIN: 10 INJECTION, EMULSION INTRAVENOUS at 10:08

## 2018-07-19 RX ADMIN — DOCUSATE SODIUM 100 MG: 50 LIQUID ORAL at 06:05

## 2018-07-19 RX ADMIN — DOCUSATE SODIUM 100 MG: 50 LIQUID ORAL at 17:48

## 2018-07-19 RX ADMIN — Medication 1 EACH: at 17:48

## 2018-07-19 RX ADMIN — PROPOFOL 15 MCG/KG/MIN: 10 INJECTION, EMULSION INTRAVENOUS at 18:37

## 2018-07-19 RX ADMIN — MIDODRINE HYDROCHLORIDE 10 MG: 5 TABLET ORAL at 17:48

## 2018-07-19 RX ADMIN — STANDARDIZED SENNA CONCENTRATE AND DOCUSATE SODIUM 1 TABLET: 8.6; 5 TABLET, FILM COATED ORAL at 20:53

## 2018-07-19 RX ADMIN — FAMOTIDINE 20 MG: 20 TABLET ORAL at 17:48

## 2018-07-19 RX ADMIN — MIDODRINE HYDROCHLORIDE 10 MG: 5 TABLET ORAL at 12:24

## 2018-07-19 RX ADMIN — FAMOTIDINE 20 MG: 20 TABLET ORAL at 06:05

## 2018-07-19 RX ADMIN — Medication 1 EACH: at 06:05

## 2018-07-19 RX ADMIN — SODIUM CHLORIDE, POTASSIUM CHLORIDE, SODIUM LACTATE AND CALCIUM CHLORIDE: 600; 310; 30; 20 INJECTION, SOLUTION INTRAVENOUS at 22:27

## 2018-07-19 RX ADMIN — CEFAZOLIN SODIUM 1 G: 1 INJECTION, SOLUTION INTRAVENOUS at 18:49

## 2018-07-19 RX ADMIN — SERTRALINE 100 MG: 100 TABLET, FILM COATED ORAL at 10:18

## 2018-07-19 RX ADMIN — MIDODRINE HYDROCHLORIDE 10 MG: 5 TABLET ORAL at 06:05

## 2018-07-19 RX ADMIN — SODIUM CHLORIDE, POTASSIUM CHLORIDE, SODIUM LACTATE AND CALCIUM CHLORIDE: 600; 310; 30; 20 INJECTION, SOLUTION INTRAVENOUS at 06:06

## 2018-07-19 RX ADMIN — POTASSIUM PHOSPHATE, MONOBASIC AND POTASSIUM PHOSPHATE, DIBASIC 30 MMOL: 224; 236 INJECTION, SOLUTION INTRAVENOUS at 09:07

## 2018-07-19 ASSESSMENT — PULMONARY FUNCTION TESTS: FVC: 1.3

## 2018-07-19 NOTE — OR NURSING
This patient went straight back. They did not come through Pre-op, but rather straight back through PACU I. This patient was not assessed by me as RN nor was WHO check list completed by me.

## 2018-07-19 NOTE — CARE PLAN
Problem: Safety  Goal: Will remain free from injury  Log roll precautions in place. C-collar in place.     Problem: Risk for Impaired Circulation/Tissue Perfusion  Goal: Optimal Post Surgical Circulation/Tissue Perfusion  Levo at 5 mcg/hr to maintain a MAP >80
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
## 2018-07-19 NOTE — PROGRESS NOTES
Ortho PA at bedside.  Discussed planned surgery for left femur today, updated him that neurosurgery had not yet cleared the patient for orthopedic surgery.  Ortho PA to talk with neurosurgery and clarify plans for OR this afternoon.
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
## 2018-07-19 NOTE — PROGRESS NOTES
Patient transported to OR with RT, RN, transport tech and anesthesiologist.  Bedside report given to anesthesiologist.

## 2018-07-19 NOTE — PROGRESS NOTES
Call placed to Dr. Pantoja, updated regarding hemoglobin level of 6.5, orders received to transfuse one unit of packed red blood cells.
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
## 2018-07-19 NOTE — CARE PLAN
Problem: Ventilation Defect:  Goal: Ability to achieve and maintain unassisted ventilation or tolerate decreased levels of ventilator support  Adult Ventilation Update    Total Vent Days: 3    Patient Lines/Drains/Airways Status    Active Airway     Name: Placement date: Placement time: Site: Days:    Airway Group ET Tube 8.0 07/17/18 0847      1                #FVC / Vital Capacity (liters) : 1750 (07/18/18 0829)  NIF (cm H2O) : -30 (07/18/18 0829)  Rapid Shallow Breathing Index (RR/VT): 33 (07/18/18 0829)     Static Compliance (ml / cm H2O): 89 (07/18/18 1809)    Patient failed trials because of Barriers to Wean: Evidence of active shock,hemmorhage or sepsis;FiO2 >60% or PEEP >10 CM H2O (07/17/18 0938)  Barriers to SBT Weaning Trial Stopped due to:: Pt weaned for 1 hour and returned to rest settings per protocol (07/18/18 1556)  Length of Weaning Trial Length of Weaning Trial (Hours): 2 (07/18/18 1556)    No changes has been made

## 2018-07-19 NOTE — CARE PLAN
Problem: Skin Integrity  Goal: Skin integrity is maintained or improved  Patient is a strict log roll, patient turned every two hours, medical devices repositioned.  Mepilex to coccyx and underneath cervical collar.  Heels floated on pillows.     Problem: Nutrition Deficit  Goal: Adequate Food and Fluid Intake  Cortrak placed and enteral feedings initiated per dietician order.  Patient made NPO for OR today.

## 2018-07-19 NOTE — PROGRESS NOTES
Hemoglobin result of 6.8, updated Dr. Khan orders to transfuse two units of packed red blood cells.  Orders implemented.

## 2018-07-20 ENCOUNTER — APPOINTMENT (OUTPATIENT)
Dept: RADIOLOGY | Facility: MEDICAL CENTER | Age: 58
DRG: 956 | End: 2018-07-20
Attending: SURGERY
Payer: COMMERCIAL

## 2018-07-20 ENCOUNTER — APPOINTMENT (OUTPATIENT)
Dept: RADIOLOGY | Facility: MEDICAL CENTER | Age: 58
DRG: 956 | End: 2018-07-20
Attending: NEUROLOGICAL SURGERY
Payer: COMMERCIAL

## 2018-07-20 ENCOUNTER — HOSPITAL ENCOUNTER (OUTPATIENT)
Dept: RADIOLOGY | Facility: MEDICAL CENTER | Age: 58
End: 2018-07-20
Attending: PHYSICIAN ASSISTANT

## 2018-07-20 ENCOUNTER — APPOINTMENT (OUTPATIENT)
Dept: RADIOLOGY | Facility: MEDICAL CENTER | Age: 58
DRG: 956 | End: 2018-07-20
Attending: PHYSICIAN ASSISTANT
Payer: COMMERCIAL

## 2018-07-20 ENCOUNTER — APPOINTMENT (OUTPATIENT)
Dept: RADIOLOGY | Facility: MEDICAL CENTER | Age: 58
DRG: 956 | End: 2018-07-20
Attending: ORTHOPAEDIC SURGERY
Payer: COMMERCIAL

## 2018-07-20 LAB
ALBUMIN SERPL BCP-MCNC: 2.6 G/DL (ref 3.2–4.9)
ALBUMIN/GLOB SERPL: 1.4 G/DL
ALP SERPL-CCNC: 34 U/L (ref 30–99)
ALT SERPL-CCNC: 28 U/L (ref 2–50)
ANION GAP SERPL CALC-SCNC: 6 MMOL/L (ref 0–11.9)
AST SERPL-CCNC: 56 U/L (ref 12–45)
BARCODED ABORH UBTYP: 600
BARCODED ABORH UBTYP: 6200
BARCODED ABORH UBTYP: 7300
BARCODED PRD CODE UBPRD: NORMAL
BARCODED UNIT NUM UBUNT: NORMAL
BASOPHILS # BLD AUTO: 0 % (ref 0–1.8)
BASOPHILS # BLD: 0 K/UL (ref 0–0.12)
BILIRUB SERPL-MCNC: 0.4 MG/DL (ref 0.1–1.5)
BUN SERPL-MCNC: 16 MG/DL (ref 8–22)
CALCIUM SERPL-MCNC: 7.3 MG/DL (ref 8.5–10.5)
CHLORIDE SERPL-SCNC: 112 MMOL/L (ref 96–112)
CO2 SERPL-SCNC: 25 MMOL/L (ref 20–33)
COMPONENT FT 8504FT: NORMAL
COMPONENT FT 8504FT: NORMAL
COMPONENT P 8504P: NORMAL
COMPONENT R 8504R: NORMAL
CREAT SERPL-MCNC: 0.98 MG/DL (ref 0.5–1.4)
EOSINOPHIL # BLD AUTO: 0 K/UL (ref 0–0.51)
EOSINOPHIL NFR BLD: 0 % (ref 0–6.9)
ERYTHROCYTE [DISTWIDTH] IN BLOOD BY AUTOMATED COUNT: 56.6 FL (ref 35.9–50)
GLOBULIN SER CALC-MCNC: 1.8 G/DL (ref 1.9–3.5)
GLUCOSE BLD-MCNC: 118 MG/DL (ref 65–99)
GLUCOSE BLD-MCNC: 128 MG/DL (ref 65–99)
GLUCOSE BLD-MCNC: 138 MG/DL (ref 65–99)
GLUCOSE SERPL-MCNC: 128 MG/DL (ref 65–99)
HCT VFR BLD AUTO: 23.3 % (ref 42–52)
HGB BLD-MCNC: 7.5 G/DL (ref 14–18)
HGB BLD-MCNC: 7.9 G/DL (ref 14–18)
HGB BLD-MCNC: 7.9 G/DL (ref 14–18)
HGB BLD-MCNC: 8.2 G/DL (ref 14–18)
IMM GRANULOCYTES # BLD AUTO: 0.08 K/UL (ref 0–0.11)
IMM GRANULOCYTES NFR BLD AUTO: 0.8 % (ref 0–0.9)
LYMPHOCYTES # BLD AUTO: 0.83 K/UL (ref 1–4.8)
LYMPHOCYTES NFR BLD: 8.5 % (ref 22–41)
MCH RBC QN AUTO: 29.9 PG (ref 27–33)
MCHC RBC AUTO-ENTMCNC: 33.9 G/DL (ref 33.7–35.3)
MCV RBC AUTO: 88.3 FL (ref 81.4–97.8)
MONOCYTES # BLD AUTO: 1.03 K/UL (ref 0–0.85)
MONOCYTES NFR BLD AUTO: 10.5 % (ref 0–13.4)
NEUTROPHILS # BLD AUTO: 7.88 K/UL (ref 1.82–7.42)
NEUTROPHILS NFR BLD: 80.2 % (ref 44–72)
NRBC # BLD AUTO: 0 K/UL
NRBC BLD-RTO: 0 /100 WBC
PLATELET # BLD AUTO: 70 K/UL (ref 164–446)
PMV BLD AUTO: 11.2 FL (ref 9–12.9)
POTASSIUM SERPL-SCNC: 3.7 MMOL/L (ref 3.6–5.5)
PRODUCT TYPE UPROD: NORMAL
PROT SERPL-MCNC: 4.4 G/DL (ref 6–8.2)
RBC # BLD AUTO: 2.64 M/UL (ref 4.7–6.1)
SODIUM SERPL-SCNC: 143 MMOL/L (ref 135–145)
UNIT STATUS USTAT: NORMAL
WBC # BLD AUTO: 9.8 K/UL (ref 4.8–10.8)

## 2018-07-20 PROCEDURE — 71045 X-RAY EXAM CHEST 1 VIEW: CPT

## 2018-07-20 PROCEDURE — 502000 HCHG MISC OR IMPLANTS RC 0278: Performed by: NEUROLOGICAL SURGERY

## 2018-07-20 PROCEDURE — 110454 HCHG SHELL REV 250: Performed by: NEUROLOGICAL SURGERY

## 2018-07-20 PROCEDURE — 85025 COMPLETE CBC W/AUTO DIFF WBC: CPT

## 2018-07-20 PROCEDURE — 160048 HCHG OR STATISTICAL LEVEL 1-5: Performed by: NEUROLOGICAL SURGERY

## 2018-07-20 PROCEDURE — 700102 HCHG RX REV CODE 250 W/ 637 OVERRIDE(OP): Performed by: PHYSICIAN ASSISTANT

## 2018-07-20 PROCEDURE — 770022 HCHG ROOM/CARE - ICU (200)

## 2018-07-20 PROCEDURE — 0SB20ZZ EXCISION OF LUMBAR VERTEBRAL DISC, OPEN APPROACH: ICD-10-PCS | Performed by: NEUROLOGICAL SURGERY

## 2018-07-20 PROCEDURE — 700111 HCHG RX REV CODE 636 W/ 250 OVERRIDE (IP): Performed by: SURGERY

## 2018-07-20 PROCEDURE — P9034 PLATELETS, PHERESIS: HCPCS

## 2018-07-20 PROCEDURE — 500698 HCHG HEMOCLIP, MEDIUM: Performed by: NEUROLOGICAL SURGERY

## 2018-07-20 PROCEDURE — 700101 HCHG RX REV CODE 250: Performed by: SURGERY

## 2018-07-20 PROCEDURE — A9270 NON-COVERED ITEM OR SERVICE: HCPCS | Performed by: SURGERY

## 2018-07-20 PROCEDURE — 501838 HCHG SUTURE GENERAL: Performed by: NEUROLOGICAL SURGERY

## 2018-07-20 PROCEDURE — 94003 VENT MGMT INPAT SUBQ DAY: CPT

## 2018-07-20 PROCEDURE — 82962 GLUCOSE BLOOD TEST: CPT | Mod: 91

## 2018-07-20 PROCEDURE — 94150 VITAL CAPACITY TEST: CPT

## 2018-07-20 PROCEDURE — 160042 HCHG SURGERY MINUTES - EA ADDL 1 MIN LEVEL 5: Performed by: NEUROLOGICAL SURGERY

## 2018-07-20 PROCEDURE — 700105 HCHG RX REV CODE 258: Performed by: SURGERY

## 2018-07-20 PROCEDURE — 73560 X-RAY EXAM OF KNEE 1 OR 2: CPT | Mod: RT

## 2018-07-20 PROCEDURE — 500122 HCHG BOVIE, BLADE: Performed by: NEUROLOGICAL SURGERY

## 2018-07-20 PROCEDURE — 500512 HCHG ENDO PEANUT: Performed by: NEUROLOGICAL SURGERY

## 2018-07-20 PROCEDURE — 86923 COMPATIBILITY TEST ELECTRIC: CPT

## 2018-07-20 PROCEDURE — 700111 HCHG RX REV CODE 636 W/ 250 OVERRIDE (IP): Performed by: ORTHOPAEDIC SURGERY

## 2018-07-20 PROCEDURE — 99291 CRITICAL CARE FIRST HOUR: CPT | Performed by: SURGERY

## 2018-07-20 PROCEDURE — P9017 PLASMA 1 DONOR FRZ W/IN 8 HR: HCPCS

## 2018-07-20 PROCEDURE — A9270 NON-COVERED ITEM OR SERVICE: HCPCS | Performed by: PHYSICIAN ASSISTANT

## 2018-07-20 PROCEDURE — 80053 COMPREHEN METABOLIC PANEL: CPT

## 2018-07-20 PROCEDURE — 85018 HEMOGLOBIN: CPT

## 2018-07-20 PROCEDURE — A9270 NON-COVERED ITEM OR SERVICE: HCPCS

## 2018-07-20 PROCEDURE — 502240 HCHG MISC OR SUPPLY RC 0272: Performed by: NEUROLOGICAL SURGERY

## 2018-07-20 PROCEDURE — 700101 HCHG RX REV CODE 250

## 2018-07-20 PROCEDURE — 700112 HCHG RX REV CODE 229: Performed by: SURGERY

## 2018-07-20 PROCEDURE — P9016 RBC LEUKOCYTES REDUCED: HCPCS | Mod: 91

## 2018-07-20 PROCEDURE — 72100 X-RAY EXAM L-S SPINE 2/3 VWS: CPT

## 2018-07-20 PROCEDURE — 160031 HCHG SURGERY MINUTES - 1ST 30 MINS LEVEL 5: Performed by: NEUROLOGICAL SURGERY

## 2018-07-20 PROCEDURE — 700111 HCHG RX REV CODE 636 W/ 250 OVERRIDE (IP)

## 2018-07-20 PROCEDURE — 160009 HCHG ANES TIME/MIN: Performed by: NEUROLOGICAL SURGERY

## 2018-07-20 PROCEDURE — 500257: Performed by: NEUROLOGICAL SURGERY

## 2018-07-20 PROCEDURE — 700102 HCHG RX REV CODE 250 W/ 637 OVERRIDE(OP): Performed by: SURGERY

## 2018-07-20 PROCEDURE — 700102 HCHG RX REV CODE 250 W/ 637 OVERRIDE(OP)

## 2018-07-20 PROCEDURE — 36430 TRANSFUSION BLD/BLD COMPNT: CPT

## 2018-07-20 PROCEDURE — 0SG10A0 FUSION OF 2 OR MORE LUMBAR VERTEBRAL JOINTS WITH INTERBODY FUSION DEVICE, ANTERIOR APPROACH, ANTERIOR COLUMN, OPEN APPROACH: ICD-10-PCS | Performed by: NEUROLOGICAL SURGERY

## 2018-07-20 RX ORDER — OXYCODONE HCL 5 MG/5 ML
5 SOLUTION, ORAL ORAL EVERY 4 HOURS PRN
Status: DISCONTINUED | OUTPATIENT
Start: 2018-07-20 | End: 2018-07-20

## 2018-07-20 RX ORDER — BACITRACIN 50000 [IU]/1
INJECTION, POWDER, FOR SOLUTION INTRAMUSCULAR
Status: DISCONTINUED | OUTPATIENT
Start: 2018-07-20 | End: 2018-07-20 | Stop reason: HOSPADM

## 2018-07-20 RX ORDER — PROPOFOL 10 MG/ML
50 INJECTION, EMULSION INTRAVENOUS ONCE
Status: DISCONTINUED | OUTPATIENT
Start: 2018-07-20 | End: 2018-07-20 | Stop reason: CLARIF

## 2018-07-20 RX ORDER — VECURONIUM BROMIDE 1 MG/ML
10 INJECTION, POWDER, LYOPHILIZED, FOR SOLUTION INTRAVENOUS ONCE
Status: DISCONTINUED | OUTPATIENT
Start: 2018-07-20 | End: 2018-07-20 | Stop reason: CLARIF

## 2018-07-20 RX ORDER — SODIUM CHLORIDE, SODIUM LACTATE, POTASSIUM CHLORIDE, AND CALCIUM CHLORIDE .6; .31; .03; .02 G/100ML; G/100ML; G/100ML; G/100ML
IRRIGANT IRRIGATION
Status: DISCONTINUED | OUTPATIENT
Start: 2018-07-20 | End: 2018-07-20 | Stop reason: HOSPADM

## 2018-07-20 RX ORDER — OXYCODONE HYDROCHLORIDE 5 MG/1
5 TABLET ORAL EVERY 4 HOURS PRN
Status: DISCONTINUED | OUTPATIENT
Start: 2018-07-20 | End: 2018-07-21

## 2018-07-20 RX ADMIN — PROPOFOL 25 MCG/KG/MIN: 10 INJECTION, EMULSION INTRAVENOUS at 15:04

## 2018-07-20 RX ADMIN — STANDARDIZED SENNA CONCENTRATE AND DOCUSATE SODIUM 1 TABLET: 8.6; 5 TABLET, FILM COATED ORAL at 20:47

## 2018-07-20 RX ADMIN — DOCUSATE SODIUM 100 MG: 50 LIQUID ORAL at 06:09

## 2018-07-20 RX ADMIN — Medication 25 MCG/HR: at 08:23

## 2018-07-20 RX ADMIN — FAMOTIDINE 20 MG: 20 TABLET ORAL at 06:09

## 2018-07-20 RX ADMIN — DOCUSATE SODIUM 100 MG: 50 LIQUID ORAL at 20:47

## 2018-07-20 RX ADMIN — PROPOFOL 15 MCG/KG/MIN: 10 INJECTION, EMULSION INTRAVENOUS at 02:28

## 2018-07-20 RX ADMIN — Medication 1 EACH: at 20:46

## 2018-07-20 RX ADMIN — MIDODRINE HYDROCHLORIDE 10 MG: 5 TABLET ORAL at 06:09

## 2018-07-20 RX ADMIN — MIDODRINE HYDROCHLORIDE 10 MG: 5 TABLET ORAL at 12:27

## 2018-07-20 RX ADMIN — NOREPINEPHRINE BITARTRATE 3 MCG/MIN: 1 INJECTION INTRAVENOUS at 02:28

## 2018-07-20 RX ADMIN — Medication 1 EACH: at 06:13

## 2018-07-20 RX ADMIN — SODIUM CHLORIDE, POTASSIUM CHLORIDE, SODIUM LACTATE AND CALCIUM CHLORIDE: 600; 310; 30; 20 INJECTION, SOLUTION INTRAVENOUS at 08:23

## 2018-07-20 RX ADMIN — MIDODRINE HYDROCHLORIDE 10 MG: 5 TABLET ORAL at 20:46

## 2018-07-20 RX ADMIN — FAMOTIDINE 20 MG: 20 TABLET ORAL at 20:47

## 2018-07-20 RX ADMIN — CEFAZOLIN SODIUM 1 G: 1 INJECTION, SOLUTION INTRAVENOUS at 08:22

## 2018-07-20 RX ADMIN — SERTRALINE 100 MG: 100 TABLET, FILM COATED ORAL at 06:09

## 2018-07-20 RX ADMIN — PROPOFOL 30 MCG/KG/MIN: 10 INJECTION, EMULSION INTRAVENOUS at 08:22

## 2018-07-20 RX ADMIN — CEFAZOLIN SODIUM 1 G: 1 INJECTION, SOLUTION INTRAVENOUS at 01:41

## 2018-07-20 ASSESSMENT — PULMONARY FUNCTION TESTS: FVC: 1.3
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
## 2018-07-20 NOTE — CARE PLAN
Problem: Ventilation Defect:  Goal: Ability to achieve and maintain unassisted ventilation or tolerate decreased levels of ventilator support  Adult Ventilation Update    Total Vent Days: 4    Patient Lines/Drains/Airways Status    Active Airway     Name: Placement date: Placement time: Site: Days:    Airway Group ET Tube 8.0 07/17/18   0847      2              Patient is currently on ASV settings and tolerating well and no distress noted. Patient did an SBT for 1 hour today and did well on parameters. Patient has a scheduled surgery tomorrow and will continue to monitor.

## 2018-07-20 NOTE — PROGRESS NOTES
Neurosurgery Progress Note    Subjective:  POD#3 C6-7 360 degree fusion with T12 - L4 laminectomy and fusion.   No changes.  .    Exam:  Sedated and intubated  Will follow in uppers, but difficulty with hands. They are quite swollen and this may be limiting his motion.   0/5 on lowers       BP  Min: 122/68  Max: 125/64  Pulse  Av.8  Min: 66  Max: 104  Resp  Av.4  Min: 8  Max: 26  Temp  Av.7 °C (99.8 °F)  Min: 37.6 °C (99.7 °F)  Max: 37.7 °C (99.9 °F)  Monitored Temp 2  Av.6 °C (99.7 °F)  Min: 36.2 °C (97.2 °F)  Max: 38.1 °C (100.6 °F)  SpO2  Av.3 %  Min: 94 %  Max: 99 %    No Data Recorded    Recent Labs      18   0530   18   1740  18   2355  18   0505   WBC  11.5*   --   10.2   --   9.8   RBC  2.52*   --   2.76*   --   2.64*   HEMOGLOBIN  7.6*   < >  8.4*  7.9*  7.9*   HEMATOCRIT  21.9*   --   24.0*   --   23.3*   MCV  86.9   --   87.0   --   88.3   MCH  30.2   --   30.4   --   29.9   MCHC  34.7   --   35.0   --   33.9   RDW  56.8*   --   55.8*   --   56.6*   PLATELETCT  80*   --   71*   --   70*   MPV  10.6   --   11.2   --   11.2    < > = values in this interval not displayed.     Recent Labs      18   1133  18   0530  18   0505   SODIUM  143  140  143   POTASSIUM  4.0  3.8  3.7   CHLORIDE  114*  113*  112   CO2  24  23  25   GLUCOSE  173*  133*  128*   BUN  17  17  16   CREATININE  1.26  1.05  0.98   CALCIUM  7.3*  7.2*  7.3*     Recent Labs      18   0936   APTT  31.1   INR  1.27*     Recent Labs      18   0846   REACTMIN  5.6   CLOTKINET  1.8   CLOTANGL  65.0   MAXCLOTS  61.3   [id number]  4.5   PRCINADP  see comment   PRCINAA  see comment       Intake/Output       18 - 18 0659 18 - 18 0659      4134-6267 3369-0321 Total 5498-8035 4883-2479 Total       Intake    I.V.  2227.6  1808.5 4036.1  --  -- --    Propofol Volume 145.1 135.3 280.4 -- -- --    Norepinephrine Volume 76.6 67.2 143.8 -- -- --     Ancef -- 100 100 -- -- --    IV Volume (K Phos ) 500 -- 500 -- -- --    IV Volume (Maintence Fluid) 1500 1500 3000 -- -- --    IV Volume (Fentanyl) 6 6 12 -- -- --    Blood  1248.8  -- 1248.8  --  -- --    Volume (RELEASE RED BLOOD CELLS) 416.3 -- 416.3 -- -- --    Volume (RELEASE RED BLOOD CELLS) 832.5 -- 832.5 -- -- --    Other  90  120 210  --  -- --    Medications (P.O./ Enteral Liquids) 90 120 210 -- -- --    Enteral  115  570 685  --  -- --    Free Water / Tube Flush 30 60 90 -- -- --    Intake (mL) (Enteral Tube 10 Vietnamese  Right Nare Cortrak Small Bowel Feeding Tube) 85 510 595 -- -- --    Total Intake 3681.4 2498.5 6179.9 -- -- --       Output    Urine  2035  1330 3365  --  -- --    Urine Void (mL) (non-catheter) 850 -- 850 -- -- --    Output (mL) (Urinary Catheter Indwelling Catheter) 1185 1330 2515 -- -- --    Drains  395  150 545  --  -- --    Output (ml) (Surgical Drain Group Neck Allne Aquino 1 (A)) 0 5 5 -- -- --    Output (ml) (Surgical Drain Group Neck Allen Aquino 2 (B)) 20 20 40 -- -- --    Output (ml) (Surgical Drain Group Back Allen Aquino 3 (C)) 375 125 500 -- -- --    Stool  0  -- 0  --  -- --    Number of Times Stooled 0 x 0 x 0 x -- -- --    Measurable Stool (mL) 0 -- 0 -- -- --    Total Output 2430 1480 3910 -- -- --       Net I/O     1251.4 1018.5 2269.9 -- -- --            Intake/Output Summary (Last 24 hours) at 07/20/18 0826  Last data filed at 07/20/18 0600   Gross per 24 hour   Intake          5855.99 ml   Output             3740 ml   Net          2115.99 ml            • sertraline  100 mg DAILY   • ceFAZolin  1 g Q8HRS   • Pharmacy Consult Request  1 Each PRN   • MD ALERT...Do not administer NSAIDS or ASPIRIN unless ORDERED By Neurosurgery  1 Each PRN   • senna-docusate  1 Tab Nightly   • senna-docusate  1 Tab Q24HRS PRN   • polyethylene glycol/lytes  1 Packet BID PRN   • magnesium hydroxide  30 mL QDAY PRN   • bisacodyl  10 mg Q24HRS PRN   • fleet  1 Each Once PRN   •  diphenhydrAMINE  25 mg Q6HRS PRN    Or   • diphenhydrAMINE  25 mg Q6HRS PRN   • ondansetron  4 mg Q4HRS PRN   • ondansetron  4 mg Q4HRS PRN   • promethazine  12.5-25 mg Q4HRS PRN   • promethazine  12.5-25 mg Q4HRS PRN   • prochlorperazine  5-10 mg Q4HRS PRN   • benzocaine-menthol  1 Lozenge Q2HRS PRN   • midodrine  10 mg TID   • LR   Continuous   • Respiratory Care per Protocol   Continuous RT   • docusate sodium 100mg/10mL  100 mg BID   • acetaminophen  650 mg Q4HRS PRN    Or   • acetaminophen  650 mg Q4HRS PRN   • insulin regular  2-9 Units Q6HRS    And   • glucose 4 g  16 g Q15 MIN PRN    And   • dextrose 50%  25 mL Q15 MIN PRN   • NORepinephrine (LEVOPHED) infusion  0-30 mcg/min Continuous   • propofol  0-80 mcg/kg/min Continuous   • fentaNYL   Continuous   • fentaNYL  25 mcg Q HOUR PRN    Or   • fentaNYL  50 mcg Q HOUR PRN    Or   • fentaNYL  100 mcg Q HOUR PRN   • bacitracin-polymyxin b   BID   • famotidine  20 mg BID    Or   • famotidine  20 mg BID       Assessment and Plan:  Hospital day #4    POD #3 C6-7 360 degree fusion, T12 - L4 laminectomy and fusion.   To  OR today for L2 corpectomy

## 2018-07-20 NOTE — DISCHARGE PLANNING
TCC order from Dr. Khan     New multiple fracture with L2 burst fracture with neurologic injury.Ongoing medical management as well as therapy need. Dr. Daniel to consult per protocol. Provider is [facility] anticipate post acute services approved by provider. [facility] not a benefit of the provider.

## 2018-07-20 NOTE — DISCHARGE PLANNING
Medical Social Work    HI spoke with pt's sister Miranda about Spinal Cord Rehab. It was decided that they wanted pt to go to [facility]. HI spoke to lisandro Self who stated that she will be at [facility] on Monday at 1pm to wither meet with family or pt's friend Christian. HI faxed over pt's facesheet and insurance to Theresa.     Plan: Pt's family or friends will meet with Theresa on Monday at 1pm.

## 2018-07-20 NOTE — DISCHARGE PLANNING
Medical Social Work    Pt's sister Miranda requested to meet with this SW. SW met with Miranda (pt's sister), Tulio (Niece), Laura (sister-in-law) and other family members. Miranda just wanted to make sure that she was included in all discussions about pt's plan of care and that pt understood he could come home with her if needed. SW explained that pt will most likely need Spinal Cord Rehab and that pt's insurance is [employer] so he will need to go to a [facility]. Pt's sister stated that she understood and that she just wanted to make sure pt felt supported.     Pt's sister stated that she wants to stay updated on pt's progress. SW suggested best times to call in and speak with bedside RN and SW suggested being at bedside during rounds so she can see the team round on pt.    SW grabbed bedside RN to provide medical update on pt's progress and upcoming surgery. Pt's sister appeared to be struggling with this information and was rocking back and forth and closed her eyes. SW provided emotional support. SW provided pt's sister with phone numbers to contact everyone at. Miranda is unsure of when she will be heading back to [place] but will let the team know.     Plan: Pt will most likely need Spinal Cord Rehab.

## 2018-07-20 NOTE — CARE PLAN
Problem: Nutritional:  Goal: Nutrition support tolerated and meeting greater than 85% of estimated needs  Outcome: MET Date Met: 07/20/18

## 2018-07-20 NOTE — CARE PLAN
Problem: Communication  Goal: The ability to communicate needs accurately and effectively will improve  Outcome: PROGRESSING AS EXPECTED  Nods appropriately, follows commands in BUE    Problem: Pain Management  Goal: Pain level will decrease to patient's comfort goal  Outcome: PROGRESSING AS EXPECTED  Fentanyl gtt in use, PRNs also available

## 2018-07-20 NOTE — OR NURSING
Patient brought to preop with RT and ICU RN. Patient is vented and family here to sign consents. Patient taken right back to OR with team.

## 2018-07-20 NOTE — PROGRESS NOTES
1556: Anesthesiologist at bedside in S103, report given to MD ALAN to meet this RN in pre-op area    1559: Pt wheeled to re-op with this RN, RT, and transport    1612: Bedside report given to MDs, care transferred to MD and OR RN
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
## 2018-07-21 ENCOUNTER — APPOINTMENT (OUTPATIENT)
Dept: RADIOLOGY | Facility: MEDICAL CENTER | Age: 58
DRG: 956 | End: 2018-07-21
Attending: PHYSICIAN ASSISTANT
Payer: COMMERCIAL

## 2018-07-21 ENCOUNTER — APPOINTMENT (OUTPATIENT)
Dept: RADIOLOGY | Facility: MEDICAL CENTER | Age: 58
DRG: 956 | End: 2018-07-21
Attending: SURGERY
Payer: COMMERCIAL

## 2018-07-21 LAB
ALBUMIN SERPL BCP-MCNC: 2.7 G/DL (ref 3.2–4.9)
ALBUMIN/GLOB SERPL: 1.3 G/DL
ALP SERPL-CCNC: 40 U/L (ref 30–99)
ALT SERPL-CCNC: 26 U/L (ref 2–50)
ANION GAP SERPL CALC-SCNC: 7 MMOL/L (ref 0–11.9)
AST SERPL-CCNC: 46 U/L (ref 12–45)
BASOPHILS # BLD AUTO: 0.1 % (ref 0–1.8)
BASOPHILS # BLD: 0.01 K/UL (ref 0–0.12)
BILIRUB SERPL-MCNC: 0.7 MG/DL (ref 0.1–1.5)
BUN SERPL-MCNC: 17 MG/DL (ref 8–22)
CALCIUM SERPL-MCNC: 7.7 MG/DL (ref 8.5–10.5)
CHLORIDE SERPL-SCNC: 111 MMOL/L (ref 96–112)
CO2 SERPL-SCNC: 26 MMOL/L (ref 20–33)
CREAT SERPL-MCNC: 0.91 MG/DL (ref 0.5–1.4)
EOSINOPHIL # BLD AUTO: 0.07 K/UL (ref 0–0.51)
EOSINOPHIL NFR BLD: 0.7 % (ref 0–6.9)
ERYTHROCYTE [DISTWIDTH] IN BLOOD BY AUTOMATED COUNT: 54.9 FL (ref 35.9–50)
GLOBULIN SER CALC-MCNC: 2.1 G/DL (ref 1.9–3.5)
GLUCOSE SERPL-MCNC: 124 MG/DL (ref 65–99)
HCT VFR BLD AUTO: 24.8 % (ref 42–52)
HGB BLD-MCNC: 8.5 G/DL (ref 14–18)
IMM GRANULOCYTES # BLD AUTO: 0.15 K/UL (ref 0–0.11)
IMM GRANULOCYTES NFR BLD AUTO: 1.6 % (ref 0–0.9)
LYMPHOCYTES # BLD AUTO: 2.01 K/UL (ref 1–4.8)
LYMPHOCYTES NFR BLD: 20.9 % (ref 22–41)
MCH RBC QN AUTO: 30.1 PG (ref 27–33)
MCHC RBC AUTO-ENTMCNC: 34.3 G/DL (ref 33.7–35.3)
MCV RBC AUTO: 87.9 FL (ref 81.4–97.8)
MONOCYTES # BLD AUTO: 1.12 K/UL (ref 0–0.85)
MONOCYTES NFR BLD AUTO: 11.7 % (ref 0–13.4)
NEUTROPHILS # BLD AUTO: 6.24 K/UL (ref 1.82–7.42)
NEUTROPHILS NFR BLD: 65 % (ref 44–72)
NRBC # BLD AUTO: 0.06 K/UL
NRBC BLD-RTO: 0.6 /100 WBC
PLATELET # BLD AUTO: 95 K/UL (ref 164–446)
PMV BLD AUTO: 11 FL (ref 9–12.9)
POTASSIUM SERPL-SCNC: 3.6 MMOL/L (ref 3.6–5.5)
PROT SERPL-MCNC: 4.8 G/DL (ref 6–8.2)
RBC # BLD AUTO: 2.82 M/UL (ref 4.7–6.1)
SODIUM SERPL-SCNC: 144 MMOL/L (ref 135–145)
WBC # BLD AUTO: 9.6 K/UL (ref 4.8–10.8)

## 2018-07-21 PROCEDURE — 700111 HCHG RX REV CODE 636 W/ 250 OVERRIDE (IP): Performed by: SURGERY

## 2018-07-21 PROCEDURE — 700112 HCHG RX REV CODE 229: Performed by: SURGERY

## 2018-07-21 PROCEDURE — A9270 NON-COVERED ITEM OR SERVICE: HCPCS | Performed by: SURGERY

## 2018-07-21 PROCEDURE — 94003 VENT MGMT INPAT SUBQ DAY: CPT

## 2018-07-21 PROCEDURE — 700102 HCHG RX REV CODE 250 W/ 637 OVERRIDE(OP): Performed by: PHYSICIAN ASSISTANT

## 2018-07-21 PROCEDURE — 700102 HCHG RX REV CODE 250 W/ 637 OVERRIDE(OP): Performed by: SURGERY

## 2018-07-21 PROCEDURE — 72131 CT LUMBAR SPINE W/O DYE: CPT

## 2018-07-21 PROCEDURE — 700105 HCHG RX REV CODE 258: Performed by: SURGERY

## 2018-07-21 PROCEDURE — 94150 VITAL CAPACITY TEST: CPT

## 2018-07-21 PROCEDURE — 99291 CRITICAL CARE FIRST HOUR: CPT | Performed by: SURGERY

## 2018-07-21 PROCEDURE — A9270 NON-COVERED ITEM OR SERVICE: HCPCS | Performed by: PHYSICIAN ASSISTANT

## 2018-07-21 PROCEDURE — 770022 HCHG ROOM/CARE - ICU (200)

## 2018-07-21 PROCEDURE — 93971 EXTREMITY STUDY: CPT

## 2018-07-21 PROCEDURE — 700101 HCHG RX REV CODE 250: Performed by: SURGERY

## 2018-07-21 PROCEDURE — 80053 COMPREHEN METABOLIC PANEL: CPT

## 2018-07-21 PROCEDURE — 85025 COMPLETE CBC W/AUTO DIFF WBC: CPT

## 2018-07-21 PROCEDURE — 71045 X-RAY EXAM CHEST 1 VIEW: CPT

## 2018-07-21 RX ORDER — METHADONE HYDROCHLORIDE 5 MG/5ML
10 SOLUTION ORAL EVERY 8 HOURS
Status: DISCONTINUED | OUTPATIENT
Start: 2018-07-21 | End: 2018-07-22

## 2018-07-21 RX ORDER — OXYCODONE HYDROCHLORIDE 10 MG/1
10 TABLET ORAL EVERY 4 HOURS PRN
Status: DISCONTINUED | OUTPATIENT
Start: 2018-07-21 | End: 2018-07-25

## 2018-07-21 RX ADMIN — SODIUM CHLORIDE, POTASSIUM CHLORIDE, SODIUM LACTATE AND CALCIUM CHLORIDE: 600; 310; 30; 20 INJECTION, SOLUTION INTRAVENOUS at 00:25

## 2018-07-21 RX ADMIN — PROPOFOL 15 MCG/KG/MIN: 10 INJECTION, EMULSION INTRAVENOUS at 02:56

## 2018-07-21 RX ADMIN — MIDODRINE HYDROCHLORIDE 10 MG: 5 TABLET ORAL at 17:58

## 2018-07-21 RX ADMIN — OXYCODONE HYDROCHLORIDE 10 MG: 10 TABLET ORAL at 14:50

## 2018-07-21 RX ADMIN — SODIUM CHLORIDE, POTASSIUM CHLORIDE, SODIUM LACTATE AND CALCIUM CHLORIDE: 600; 310; 30; 20 INJECTION, SOLUTION INTRAVENOUS at 15:38

## 2018-07-21 RX ADMIN — OXYCODONE HYDROCHLORIDE 10 MG: 10 TABLET ORAL at 10:13

## 2018-07-21 RX ADMIN — Medication 1 EACH: at 06:07

## 2018-07-21 RX ADMIN — STANDARDIZED SENNA CONCENTRATE AND DOCUSATE SODIUM 1 TABLET: 8.6; 5 TABLET, FILM COATED ORAL at 20:12

## 2018-07-21 RX ADMIN — METHADONE HYDROCHLORIDE 10 MG: 5 SOLUTION ORAL at 20:12

## 2018-07-21 RX ADMIN — MIDODRINE HYDROCHLORIDE 10 MG: 5 TABLET ORAL at 11:40

## 2018-07-21 RX ADMIN — NOREPINEPHRINE BITARTRATE 8 MCG/MIN: 1 INJECTION INTRAVENOUS at 06:08

## 2018-07-21 RX ADMIN — DOCUSATE SODIUM 100 MG: 50 LIQUID ORAL at 06:08

## 2018-07-21 RX ADMIN — DOCUSATE SODIUM 100 MG: 50 LIQUID ORAL at 17:58

## 2018-07-21 RX ADMIN — Medication 1 EACH: at 17:58

## 2018-07-21 RX ADMIN — SERTRALINE 100 MG: 100 TABLET, FILM COATED ORAL at 06:08

## 2018-07-21 RX ADMIN — FAMOTIDINE 20 MG: 20 TABLET ORAL at 17:57

## 2018-07-21 RX ADMIN — FAMOTIDINE 20 MG: 20 TABLET ORAL at 06:08

## 2018-07-21 RX ADMIN — MIDODRINE HYDROCHLORIDE 10 MG: 5 TABLET ORAL at 06:07

## 2018-07-21 RX ADMIN — METHADONE HYDROCHLORIDE 10 MG: 5 SOLUTION ORAL at 13:56

## 2018-07-21 RX ADMIN — OXYCODONE HYDROCHLORIDE 5 MG: 5 TABLET ORAL at 06:08

## 2018-07-21 ASSESSMENT — PULMONARY FUNCTION TESTS: FVC: 1.2

## 2018-07-21 NOTE — CARE PLAN
Problem: Ventilation Defect:  Goal: Ability to achieve and maintain unassisted ventilation or tolerate decreased levels of ventilator support    Intervention: Support and monitor invasive and noninvasive mechanical ventilation  Adult Ventilation Update    Total Vent Days: 5    SBT for a total of 2 hours today  NIF - 25  FVC 1.3L    Continue weaning as tolerated
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
## 2018-07-21 NOTE — PROGRESS NOTES
Neurosurgery Progress Note  POD#1 L2 corpectomy (transthoracic approach)  POD#4 C6-7 ACDF/ PSF  POD#4 T12-L4 PSF    Off propopol  Follows commands   2/5 weakly in BUEs  0/5 in BLEs  Wearing Aspen collar  Drains removed in OR yesterday     BP  Min: 141/66  Max: 141/66  Pulse  Av.7  Min: 59  Max: 83  Resp  Avg: 15.8  Min: 0  Max: 27  Temp  Av.1 °C (100.6 °F)  Min: 38.1 °C (100.6 °F)  Max: 38.1 °C (100.6 °F)  Monitored Temp 2  Av.8 °C (100.1 °F)  Min: 37.4 °C (99.3 °F)  Max: 38.3 °C (100.9 °F)  SpO2  Av.7 %  Min: 92 %  Max: 99 %    No Data Recorded    Recent Labs      18   1740   18   0505  18   1230  18   1958  18   0250   WBC  10.2   --   9.8   --    --   9.6   RBC  2.76*   --   2.64*   --    --   2.82*   HEMOGLOBIN  8.4*   < >  7.9*  7.5*  8.2*  8.5*   HEMATOCRIT  24.0*   --   23.3*   --    --   24.8*   MCV  87.0   --   88.3   --    --   87.9   MCH  30.4   --   29.9   --    --   30.1   MCHC  35.0   --   33.9   --    --   34.3   RDW  55.8*   --   56.6*   --    --   54.9*   PLATELETCT  71*   --   70*   --    --   95*   MPV  11.2   --   11.2   --    --   11.0    < > = values in this interval not displayed.     Recent Labs      18   0530  18   0505  18   0505   SODIUM  140  143  144   POTASSIUM  3.8  3.7  3.6   CHLORIDE  113*  112  111   CO2  23  25  26   GLUCOSE  133*  128*  124*   BUN  17  16  17   CREATININE  1.05  0.98  0.91   CALCIUM  7.2*  7.3*  7.7*               Intake/Output       18 07 - 18 0659 18 - 18 0659       Total  Total       Intake    I.V.  1230.1  3305.6 4535.7  210.7  -- 210.7    Crystalloid Intake -- 1000 1000 -- -- --    Propofol Volume 125.7 260.7 386.4 27.7 -- 27.7    Norepinephrine Volume 100.4 135.9 236.3 30 -- 30    Ancef 100 -- 100 -- -- --    IV Volume (LR Bolus) -- 1000 1000 -- -- --    IV Volume (Maintence Fluid)  150 -- 150    IV Volume  (Fentanyl) 4 9 13 3 -- 3    Blood  300  1270 1570  --  -- --    Cell Saver Volume (mL) -- 120 120 -- -- --    PRBC Total Volume (Non-Barcoded) -- 700 700 -- -- --    FFP Total Volume (Non-Barcoded) -- 450 450 -- -- --    Volume (RELEASE PLATELET PHERESIS) 300 -- 300 -- -- --    Other  --  90 90  --  -- --    Medications (P.O./ Enteral Liquids) -- 90 90 -- -- --    Enteral  150  690 840  120  -- 120    Free Water / Tube Flush 30 90 120 -- -- --    Intake (mL) (Enteral Tube 10 [place]  Right Nare Cortrak Small Bowel Feeding Tube) 120 600 720 120 -- 120    Total Intake 1680.1 5355.6 7035.7 330.7 -- 330.7       Output    Urine  2000  2750 4750  400  -- 400    Urine Void (mL) (non-catheter) -- 1300 1300 -- -- --    Output (mL) (Urinary Catheter Indwelling Catheter) 2000 1450 3450 400 -- 400    Drains  150  -- 150  --  -- --    Output (ml) ([REMOVED] [facility] Neck Allen Aquino 1 (A)) 5 -- 5 -- -- --    Output (ml) ([REMOVED] [facility] Neck Allen Aquino 2 (B)) 5 -- 5 -- -- --    Output (ml) ([REMOVED] [facility] Back Allen Aquino 3 (C)) 140 -- 140 -- -- --    Stool  --  -- --  --  -- --    Number of Times Stooled 0 x 0 x 0 x 0 x -- 0 x    Blood  --  450 450  --  -- --    Est. Blood Loss (mL) -- 450 450 -- -- --    Total Output 2150 3200 5350 400 -- 400       Net I/O     -469.9 2155.6 1685.7 -69.3 -- -69.3            Intake/Output Summary (Last 24 hours) at 07/21/18 0930  Last data filed at 07/21/18 0800   Gross per 24 hour   Intake             6822 ml   Output             5250 ml   Net             1572 ml            • oxyCODONE immediate-release  5 mg Q4HRS PRN   • sertraline  100 mg DAILY   • Pharmacy Consult Request  1 Each PRN   • MD ALERT...Do not administer NSAIDS or ASPIRIN unless ORDERED By Neurosurgery  1 Each PRN   • senna-docusate  1 Tab Nightly   • senna-docusate  1 Tab Q24HRS PRN   • polyethylene glycol/lytes  1 Packet BID PRN   • magnesium hydroxide  30 mL QDAY PRN   •  bisacodyl  10 mg Q24HRS PRN   • fleet  1 Each Once PRN   • diphenhydrAMINE  25 mg Q6HRS PRN    Or   • diphenhydrAMINE  25 mg Q6HRS PRN   • ondansetron  4 mg Q4HRS PRN   • ondansetron  4 mg Q4HRS PRN   • promethazine  12.5-25 mg Q4HRS PRN   • promethazine  12.5-25 mg Q4HRS PRN   • prochlorperazine  5-10 mg Q4HRS PRN   • benzocaine-menthol  1 Lozenge Q2HRS PRN   • midodrine  10 mg TID   • LR   Continuous   • Respiratory Care per Protocol   Continuous RT   • docusate sodium 100mg/10mL  100 mg BID   • acetaminophen  650 mg Q4HRS PRN    Or   • acetaminophen  650 mg Q4HRS PRN   • NORepinephrine (LEVOPHED) infusion  0-30 mcg/min Continuous   • propofol  0-80 mcg/kg/min Continuous   • fentaNYL   Continuous   • fentaNYL  25 mcg Q HOUR PRN    Or   • fentaNYL  50 mcg Q HOUR PRN    Or   • fentaNYL  100 mcg Q HOUR PRN   • bacitracin-polymyxin b   BID   • famotidine  20 mg BID    Or   • famotidine  20 mg BID       Assessment and Plan:  POD #1 / 4 / 4  C collar AAT  TLSO when OOB  Postop CXR fine - no evidence of pneumo or hemothorax  Pending postop CT L spine  OK for lovenox tomorrow

## 2018-07-21 NOTE — OR NURSING
Lumbar dressing is saturated with serosanguineous drainage. Plan to change to dry dressing when finished with this L 2 Corpectomy per Dr Patrick.

## 2018-07-21 NOTE — CARE PLAN
Problem: Ventilation Defect:  Goal: Ability to achieve and maintain unassisted ventilation or tolerate decreased levels of ventilator support  Outcome: PROGRESSING SLOWER THAN EXPECTED    Intervention: Support and monitor invasive and noninvasive mechanical ventilation  Adult Ventilation Update    Total Vent Days: 5    Patient Lines/Drains/Airways Status    Active Airway     Name: Placement date: Placement time: Site: Days:    Airway Group ET Tube 8.0 07/17/18   0847      3                  #FVC / Vital Capacity (liters) : 1.3 (07/20/18 0733)  NIF (cm H2O) : -23 (07/20/18 0733)  Rapid Shallow Breathing Index (RR/VT): 27 (07/20/18 0733)  Plateau Pressure (Q Shift): 18 (07/20/18 1412)  Static Compliance (ml / cm H2O): 48.6 (07/21/18 0125)    Patient failed trials because of Barriers to Wean: Evidence of active shock,hemmorhage or sepsis;FiO2 >60% or PEEP >10 CM H2O (07/17/18 0938)  Barriers to SBT Weaning Trial Stopped due to:: Pt weaned for 1 hour and returned to rest settings per protocol (07/20/18 0733)  Length of Weaning Trial Length of Weaning Trial (Hours): 1 (07/20/18 0733)        Sputum/Suction   Cough: Productive (07/21/18 0400)  Sputum Amount: Small (07/21/18 0400)  Sputum Color: White;Yellow (07/21/18 0400)  Sputum Consistency: Thick;Thin (07/21/18 0400)    Mobility  Level of Mobility: Level III (07/20/18 2000)  Activity Performed: Unable to mobilize (07/20/18 2000)  Pt Calls for Assistance: No (07/20/18 2000)  Reason Not Mobilized: Unstable condition;Patient refused - pain (07/20/18 2000)  Mobilization Comments:  (POD#0) (07/20/18 2000)    Events/Summary/Plan: pt placed on ASV (07/21/18 0125)
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
## 2018-07-22 ENCOUNTER — APPOINTMENT (OUTPATIENT)
Dept: RADIOLOGY | Facility: MEDICAL CENTER | Age: 58
DRG: 956 | End: 2018-07-22
Attending: SURGERY
Payer: COMMERCIAL

## 2018-07-22 PROBLEM — I82.4Z2 ACUTE DEEP VEIN THROMBOSIS (DVT) OF DISTAL VEIN OF LEFT LOWER EXTREMITY (HCC): Status: ACTIVE | Noted: 2018-07-22

## 2018-07-22 LAB
ALBUMIN SERPL BCP-MCNC: 2.7 G/DL (ref 3.2–4.9)
ALBUMIN/GLOB SERPL: 1.2 G/DL
ALP SERPL-CCNC: 36 U/L (ref 30–99)
ALT SERPL-CCNC: 25 U/L (ref 2–50)
ANION GAP SERPL CALC-SCNC: 5 MMOL/L (ref 0–11.9)
AST SERPL-CCNC: 34 U/L (ref 12–45)
BASOPHILS # BLD AUTO: 0.2 % (ref 0–1.8)
BASOPHILS # BLD: 0.02 K/UL (ref 0–0.12)
BILIRUB SERPL-MCNC: 0.8 MG/DL (ref 0.1–1.5)
BUN SERPL-MCNC: 22 MG/DL (ref 8–22)
CALCIUM SERPL-MCNC: 7.5 MG/DL (ref 8.5–10.5)
CHLORIDE SERPL-SCNC: 111 MMOL/L (ref 96–112)
CO2 SERPL-SCNC: 28 MMOL/L (ref 20–33)
CREAT SERPL-MCNC: 0.78 MG/DL (ref 0.5–1.4)
EOSINOPHIL # BLD AUTO: 0.28 K/UL (ref 0–0.51)
EOSINOPHIL NFR BLD: 2.6 % (ref 0–6.9)
ERYTHROCYTE [DISTWIDTH] IN BLOOD BY AUTOMATED COUNT: 55.6 FL (ref 35.9–50)
GLOBULIN SER CALC-MCNC: 2.2 G/DL (ref 1.9–3.5)
GLUCOSE SERPL-MCNC: 122 MG/DL (ref 65–99)
HCT VFR BLD AUTO: 24.8 % (ref 42–52)
HGB BLD-MCNC: 8.2 G/DL (ref 14–18)
IMM GRANULOCYTES # BLD AUTO: 0.12 K/UL (ref 0–0.11)
IMM GRANULOCYTES NFR BLD AUTO: 1.1 % (ref 0–0.9)
IRON SATN MFR SERPL: ABNORMAL % (ref 15–55)
IRON SERPL-MCNC: <10 UG/DL (ref 50–180)
LYMPHOCYTES # BLD AUTO: 1.34 K/UL (ref 1–4.8)
LYMPHOCYTES NFR BLD: 12.4 % (ref 22–41)
MCH RBC QN AUTO: 30.1 PG (ref 27–33)
MCHC RBC AUTO-ENTMCNC: 33.1 G/DL (ref 33.7–35.3)
MCV RBC AUTO: 91.2 FL (ref 81.4–97.8)
MONOCYTES # BLD AUTO: 0.92 K/UL (ref 0–0.85)
MONOCYTES NFR BLD AUTO: 8.5 % (ref 0–13.4)
NEUTROPHILS # BLD AUTO: 8.16 K/UL (ref 1.82–7.42)
NEUTROPHILS NFR BLD: 75.2 % (ref 44–72)
NRBC # BLD AUTO: 0 K/UL
NRBC BLD-RTO: 0 /100 WBC
PLATELET # BLD AUTO: 89 K/UL (ref 164–446)
PMV BLD AUTO: 10.8 FL (ref 9–12.9)
POTASSIUM SERPL-SCNC: 3.5 MMOL/L (ref 3.6–5.5)
PROT SERPL-MCNC: 4.9 G/DL (ref 6–8.2)
RBC # BLD AUTO: 2.72 M/UL (ref 4.7–6.1)
SODIUM SERPL-SCNC: 144 MMOL/L (ref 135–145)
TIBC SERPL-MCNC: 197 UG/DL (ref 250–450)
WBC # BLD AUTO: 10.8 K/UL (ref 4.8–10.8)

## 2018-07-22 PROCEDURE — 94150 VITAL CAPACITY TEST: CPT

## 2018-07-22 PROCEDURE — 700101 HCHG RX REV CODE 250: Performed by: SURGERY

## 2018-07-22 PROCEDURE — 71045 X-RAY EXAM CHEST 1 VIEW: CPT

## 2018-07-22 PROCEDURE — 700112 HCHG RX REV CODE 229: Performed by: SURGERY

## 2018-07-22 PROCEDURE — A9270 NON-COVERED ITEM OR SERVICE: HCPCS | Performed by: PHYSICIAN ASSISTANT

## 2018-07-22 PROCEDURE — A9270 NON-COVERED ITEM OR SERVICE: HCPCS | Performed by: SURGERY

## 2018-07-22 PROCEDURE — 700102 HCHG RX REV CODE 250 W/ 637 OVERRIDE(OP): Performed by: SURGERY

## 2018-07-22 PROCEDURE — 83550 IRON BINDING TEST: CPT

## 2018-07-22 PROCEDURE — 700111 HCHG RX REV CODE 636 W/ 250 OVERRIDE (IP): Performed by: SURGERY

## 2018-07-22 PROCEDURE — 83540 ASSAY OF IRON: CPT

## 2018-07-22 PROCEDURE — 94667 MNPJ CHEST WALL 1ST: CPT

## 2018-07-22 PROCEDURE — 94003 VENT MGMT INPAT SUBQ DAY: CPT

## 2018-07-22 PROCEDURE — 700102 HCHG RX REV CODE 250 W/ 637 OVERRIDE(OP): Performed by: PHYSICIAN ASSISTANT

## 2018-07-22 PROCEDURE — 85025 COMPLETE CBC W/AUTO DIFF WBC: CPT

## 2018-07-22 PROCEDURE — 80053 COMPREHEN METABOLIC PANEL: CPT

## 2018-07-22 PROCEDURE — 94669 MECHANICAL CHEST WALL OSCILL: CPT

## 2018-07-22 PROCEDURE — 770022 HCHG ROOM/CARE - ICU (200)

## 2018-07-22 PROCEDURE — 99291 CRITICAL CARE FIRST HOUR: CPT | Performed by: SURGERY

## 2018-07-22 PROCEDURE — 94760 N-INVAS EAR/PLS OXIMETRY 1: CPT

## 2018-07-22 RX ADMIN — POTASSIUM BICARBONATE 25 MEQ: 25 TABLET, EFFERVESCENT ORAL at 18:20

## 2018-07-22 RX ADMIN — OXYCODONE HYDROCHLORIDE 10 MG: 10 TABLET ORAL at 19:11

## 2018-07-22 RX ADMIN — MIDODRINE HYDROCHLORIDE 10 MG: 5 TABLET ORAL at 12:16

## 2018-07-22 RX ADMIN — STANDARDIZED SENNA CONCENTRATE AND DOCUSATE SODIUM 1 TABLET: 8.6; 5 TABLET, FILM COATED ORAL at 22:59

## 2018-07-22 RX ADMIN — ENOXAPARIN SODIUM 30 MG: 100 INJECTION SUBCUTANEOUS at 08:51

## 2018-07-22 RX ADMIN — Medication 1 EACH: at 05:08

## 2018-07-22 RX ADMIN — MIDODRINE HYDROCHLORIDE 10 MG: 5 TABLET ORAL at 05:08

## 2018-07-22 RX ADMIN — POTASSIUM BICARBONATE 25 MEQ: 25 TABLET, EFFERVESCENT ORAL at 12:16

## 2018-07-22 RX ADMIN — METHADONE HYDROCHLORIDE 10 MG: 5 SOLUTION ORAL at 05:08

## 2018-07-22 RX ADMIN — IRON SUCROSE 200 MG: 20 INJECTION, SOLUTION INTRAVENOUS at 18:12

## 2018-07-22 RX ADMIN — Medication 1 EACH: at 18:12

## 2018-07-22 RX ADMIN — DOCUSATE SODIUM 100 MG: 50 LIQUID ORAL at 18:13

## 2018-07-22 RX ADMIN — FAMOTIDINE 20 MG: 20 TABLET ORAL at 05:08

## 2018-07-22 RX ADMIN — DOCUSATE SODIUM 100 MG: 50 LIQUID ORAL at 05:08

## 2018-07-22 RX ADMIN — MIDODRINE HYDROCHLORIDE 10 MG: 5 TABLET ORAL at 18:12

## 2018-07-22 RX ADMIN — FAMOTIDINE 20 MG: 20 TABLET ORAL at 18:12

## 2018-07-22 RX ADMIN — SERTRALINE 100 MG: 100 TABLET, FILM COATED ORAL at 05:08

## 2018-07-22 RX ADMIN — ENOXAPARIN SODIUM 30 MG: 100 INJECTION SUBCUTANEOUS at 18:12

## 2018-07-22 ASSESSMENT — COPD QUESTIONNAIRES
DO YOU EVER COUGH UP ANY MUCUS OR PHLEGM?: YES, A FEW DAYS A WEEK OR MONTH
COPD SCREENING SCORE: 3
DURING THE PAST 4 WEEKS HOW MUCH DID YOU FEEL SHORT OF BREATH: NONE/LITTLE OF THE TIME
HAVE YOU SMOKED AT LEAST 100 CIGARETTES IN YOUR ENTIRE LIFE: NO/DON'T KNOW

## 2018-07-22 ASSESSMENT — PAIN SCALES - GENERAL
PAINLEVEL_OUTOF10: 3
PAINLEVEL_OUTOF10: 0
PAINLEVEL_OUTOF10: 6

## 2018-07-22 ASSESSMENT — LIFESTYLE VARIABLES: EVER_SMOKED: NEVER

## 2018-07-22 ASSESSMENT — PULMONARY FUNCTION TESTS: FVC: 1.1
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
## 2018-07-22 NOTE — RESPIRATORY CARE
Ventilator Weaning Update    Patient is on vent day 6.  SBT was tolerated for a minimum of 1 hours on settings of 5/8.    Wean parameters for this SBT were:  #FVC / Vital Capacity (liters) : 1.1 (07/22/18 0811)  NIF (cm H2O) : -30 (07/22/18 0811)  Rapid Shallow Breathing Index (RR/VT): 14 (07/22/18 0811)  RR (bpm): 12 (07/22/18 0811)  Spontaneous VE: 6.9 (07/22/18 0811)  Spontaneous VT: 630 (07/22/18 0811)

## 2018-07-22 NOTE — CARE PLAN
Problem: Communication  Goal: The ability to communicate needs accurately and effectively will improve  Outcome: PROGRESSING SLOWER THAN EXPECTED  Pt intubated and obtunded, unable to communicate needs at this time.    Problem: Pain Management  Goal: Pain level will decrease to patient's comfort goal  Outcome: PROGRESSING AS EXPECTED  Pt on fentanyl gtt, no s/s of pain at this time.

## 2018-07-22 NOTE — PROGRESS NOTES
IV Iron Per Pharmacy Note    Reason for Iron Replacement: Acute Blood Loss      Lab Results   Component Value Date/Time    WBC 10.8 07/22/2018 05:00 AM    RBC 2.72 (L) 07/22/2018 05:00 AM    HEMOGLOBIN 8.2 (L) 07/22/2018 05:00 AM    HEMATOCRIT 24.8 (L) 07/22/2018 05:00 AM    MCV 91.2 07/22/2018 05:00 AM    MCH 30.1 07/22/2018 05:00 AM    MCHC 33.1 (L) 07/22/2018 05:00 AM    MPV 10.8 07/22/2018 05:00 AM       Recent Labs      07/22/18   0500   IRON  <10*         Recent Labs      07/22/18   0500   CREATININE  0.78          Assessment/Plan:  1. IV Iron Indicated.   2. Give Iron Sucrose 200 mg iv daily x 5 doses.    Emily Burleson, Pharm.D., BCPS
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
## 2018-07-22 NOTE — PROGRESS NOTES
Extubation orders received from Dr. Khan - pt drowsy but following commands. RT Delano at bedside, extubated to 3L NC without complication. Pt resting with no s/s of distress.

## 2018-07-22 NOTE — PROGRESS NOTES
Neurosurgery Progress Note  POD#2 L2 corpectomy (transthoracic approach)  POD#5 C6-7 ACDF/ PSF  POD#5 T12-L4 PSF     Off propopol  Follows commands   3/5 weakly in BUEs  0/5 in BLEs  Absent sensation below umbilicus  Wearing Aspen collar  Dressing dry      Pulse  Av  Min: 70  Max: 87  Resp  Av.7  Min: 13  Max: 25  Monitored Temp 2  Av °C (100.4 °F)  Min: 37.5 °C (99.5 °F)  Max: 38.3 °C (100.9 °F)  SpO2  Av.6 %  Min: 94 %  Max: 98 %    No Data Recorded    Recent Labs      18   0505   18   19518   0250  18   0500   WBC  9.8   --    --   9.6  10.8   RBC  2.64*   --    --   2.82*  2.72*   HEMOGLOBIN  7.9*   < >  8.2*  8.5*  8.2*   HEMATOCRIT  23.3*   --    --   24.8*  24.8*   MCV  88.3   --    --   87.9  91.2   MCH  29.9   --    --   30.1  30.1   MCHC  33.9   --    --   34.3  33.1*   RDW  56.6*   --    --   54.9*  55.6*   PLATELETCT  70*   --    --   95*  89*   MPV  11.2   --    --   11.0  10.8    < > = values in this interval not displayed.     Recent Labs      18   0505  18   0505  18   0500   SODIUM  143  144  144   POTASSIUM  3.7  3.6  3.5*   CHLORIDE  112  111  111   CO2  25  26  28   GLUCOSE  128*  124*  122*   BUN  16  17  22   CREATININE  0.98  0.91  0.78   CALCIUM  7.3*  7.7*  7.5*               Intake/Output       18 - 18 - 1859       Total [id number] Total       Intake    I.V.  556.6  378 934.6  --  -- --    Propofol Volume 38.4 -- 38.4 -- -- --    Norepinephrine Volume 50.2 -- 50.2 -- -- --    IV Volume (Maintence Fluid) 450 360 810 -- -- --    IV Volume (Fentanyl) 18 18 36 -- -- --    Other  120  30 150  --  -- --    Medications (P.O./ Enteral Liquids) 120 30 150 -- -- --    Enteral  900  750 1650  --  -- --    Free Water / Tube Flush 180 30 210 -- -- --    Intake (mL) (Enteral Tube 10 Khmer  Right Nare Cortrak Small Bowel Feeding Tube)  -- -- --    Total  Intake 1576.6 1158 2734.6 -- -- --       Output    Urine  1650  825 2475  --  -- --    Output (mL) (Urinary Catheter Indwelling Catheter) 0989 563 7034 -- -- --    Stool  --  -- --  --  -- --    Number of Times Stooled 0 x -- 0 x -- -- --    Total Output 1287 003 8767 -- -- --       Net I/O     -73.4 333 259.6 -- -- --            Intake/Output Summary (Last 24 hours) at 07/22/18 0719  Last data filed at 07/22/18 0600   Gross per 24 hour   Intake          2734.59 ml   Output             2475 ml   Net           259.59 ml            • oxyCODONE immediate-release  10 mg Q4HRS PRN   • methadone  10 mg Q8HRS   • IRON REPLACEMENT   PRN   • sertraline  100 mg DAILY   • Pharmacy Consult Request  1 Each PRN   • MD ALERT...Do not administer NSAIDS or ASPIRIN unless ORDERED By Neurosurgery  1 Each PRN   • senna-docusate  1 Tab Nightly   • senna-docusate  1 Tab Q24HRS PRN   • polyethylene glycol/lytes  1 Packet BID PRN   • magnesium hydroxide  30 mL QDAY PRN   • bisacodyl  10 mg Q24HRS PRN   • fleet  1 Each Once PRN   • diphenhydrAMINE  25 mg Q6HRS PRN    Or   • diphenhydrAMINE  25 mg Q6HRS PRN   • ondansetron  4 mg Q4HRS PRN   • ondansetron  4 mg Q4HRS PRN   • promethazine  12.5-25 mg Q4HRS PRN   • promethazine  12.5-25 mg Q4HRS PRN   • prochlorperazine  5-10 mg Q4HRS PRN   • benzocaine-menthol  1 Lozenge Q2HRS PRN   • midodrine  10 mg TID   • LR   Continuous   • Respiratory Care per Protocol   Continuous RT   • docusate sodium 100mg/10mL  100 mg BID   • acetaminophen  650 mg Q4HRS PRN    Or   • acetaminophen  650 mg Q4HRS PRN   • propofol  0-80 mcg/kg/min Continuous   • fentaNYL   Continuous   • fentaNYL  25 mcg Q HOUR PRN    Or   • fentaNYL  50 mcg Q HOUR PRN    Or   • fentaNYL  100 mcg Q HOUR PRN   • bacitracin-polymyxin b   BID   • famotidine  20 mg BID    Or   • famotidine  20 mg BID       Assessment and Plan:  POD #2 / 5 / 5  Postop CT L spine looks good   Ok for lovenox today  C collar AAT  TLSO when OOB  Will continue  to follow     Prophylactic anticoagulation: yes         Start date/time: start today

## 2018-07-22 NOTE — RESPIRATORY CARE
Extubation    Cuff leak noted Yes    Stridor present No     FiO2%: 40 % (07/21/18 1800)        Patient toleration No complications at this time  RCP Complete? yes  Events/Summary/Plan: Extubated per MD order (07/22/18 1324)

## 2018-07-23 ENCOUNTER — APPOINTMENT (OUTPATIENT)
Dept: RADIOLOGY | Facility: MEDICAL CENTER | Age: 58
DRG: 956 | End: 2018-07-23
Attending: SURGERY
Payer: COMMERCIAL

## 2018-07-23 ENCOUNTER — APPOINTMENT (OUTPATIENT)
Dept: RADIOLOGY | Facility: MEDICAL CENTER | Age: 58
DRG: 956 | End: 2018-07-23
Attending: INTERNAL MEDICINE
Payer: COMMERCIAL

## 2018-07-23 PROBLEM — D62 ANEMIA ASSOCIATED WITH ACUTE BLOOD LOSS: Status: ACTIVE | Noted: 2018-07-19

## 2018-07-23 LAB
ALBUMIN SERPL BCP-MCNC: 2.7 G/DL (ref 3.2–4.9)
ALBUMIN/GLOB SERPL: 1.3 G/DL
ALP SERPL-CCNC: 38 U/L (ref 30–99)
ALT SERPL-CCNC: 28 U/L (ref 2–50)
ANION GAP SERPL CALC-SCNC: 7 MMOL/L (ref 0–11.9)
AST SERPL-CCNC: 34 U/L (ref 12–45)
BASOPHILS # BLD AUTO: 0.1 % (ref 0–1.8)
BASOPHILS # BLD: 0.01 K/UL (ref 0–0.12)
BILIRUB SERPL-MCNC: 0.7 MG/DL (ref 0.1–1.5)
BUN SERPL-MCNC: 24 MG/DL (ref 8–22)
CALCIUM SERPL-MCNC: 7.7 MG/DL (ref 8.5–10.5)
CHLORIDE SERPL-SCNC: 112 MMOL/L (ref 96–112)
CO2 SERPL-SCNC: 26 MMOL/L (ref 20–33)
CREAT SERPL-MCNC: 0.8 MG/DL (ref 0.5–1.4)
CRP SERPL HS-MCNC: 21.48 MG/DL (ref 0–0.75)
EOSINOPHIL # BLD AUTO: 0.17 K/UL (ref 0–0.51)
EOSINOPHIL NFR BLD: 1.5 % (ref 0–6.9)
ERYTHROCYTE [DISTWIDTH] IN BLOOD BY AUTOMATED COUNT: 57.6 FL (ref 35.9–50)
GLOBULIN SER CALC-MCNC: 2.1 G/DL (ref 1.9–3.5)
GLUCOSE SERPL-MCNC: 135 MG/DL (ref 65–99)
HCT VFR BLD AUTO: 24.8 % (ref 42–52)
HGB BLD-MCNC: 8 G/DL (ref 14–18)
IMM GRANULOCYTES # BLD AUTO: 0.15 K/UL (ref 0–0.11)
IMM GRANULOCYTES NFR BLD AUTO: 1.4 % (ref 0–0.9)
LYMPHOCYTES # BLD AUTO: 0.95 K/UL (ref 1–4.8)
LYMPHOCYTES NFR BLD: 8.6 % (ref 22–41)
MAGNESIUM SERPL-MCNC: 2.2 MG/DL (ref 1.5–2.5)
MCH RBC QN AUTO: 30.1 PG (ref 27–33)
MCHC RBC AUTO-ENTMCNC: 32.3 G/DL (ref 33.7–35.3)
MCV RBC AUTO: 93.2 FL (ref 81.4–97.8)
MONOCYTES # BLD AUTO: 0.93 K/UL (ref 0–0.85)
MONOCYTES NFR BLD AUTO: 8.4 % (ref 0–13.4)
NEUTROPHILS # BLD AUTO: 8.89 K/UL (ref 1.82–7.42)
NEUTROPHILS NFR BLD: 80 % (ref 44–72)
NRBC # BLD AUTO: 0 K/UL
NRBC BLD-RTO: 0 /100 WBC
PHOSPHATE SERPL-MCNC: 1.1 MG/DL (ref 2.5–4.5)
PLATELET # BLD AUTO: 121 K/UL (ref 164–446)
PMV BLD AUTO: 10.7 FL (ref 9–12.9)
POTASSIUM SERPL-SCNC: 3.7 MMOL/L (ref 3.6–5.5)
PREALB SERPL-MCNC: 8 MG/DL (ref 18–38)
PROT SERPL-MCNC: 4.8 G/DL (ref 6–8.2)
RBC # BLD AUTO: 2.66 M/UL (ref 4.7–6.1)
SODIUM SERPL-SCNC: 145 MMOL/L (ref 135–145)
WBC # BLD AUTO: 11.1 K/UL (ref 4.8–10.8)

## 2018-07-23 PROCEDURE — 92610 EVALUATE SWALLOWING FUNCTION: CPT

## 2018-07-23 PROCEDURE — 700112 HCHG RX REV CODE 229: Performed by: SURGERY

## 2018-07-23 PROCEDURE — L0458 TLSO 2MOD SYMPHIS-XIPHO PRE: HCPCS

## 2018-07-23 PROCEDURE — 80053 COMPREHEN METABOLIC PANEL: CPT

## 2018-07-23 PROCEDURE — G8997 SWALLOW GOAL STATUS: HCPCS | Mod: CI

## 2018-07-23 PROCEDURE — A9270 NON-COVERED ITEM OR SERVICE: HCPCS | Performed by: SURGERY

## 2018-07-23 PROCEDURE — 700111 HCHG RX REV CODE 636 W/ 250 OVERRIDE (IP): Performed by: SURGERY

## 2018-07-23 PROCEDURE — 86140 C-REACTIVE PROTEIN: CPT

## 2018-07-23 PROCEDURE — 71045 X-RAY EXAM CHEST 1 VIEW: CPT

## 2018-07-23 PROCEDURE — 700102 HCHG RX REV CODE 250 W/ 637 OVERRIDE(OP): Performed by: SURGERY

## 2018-07-23 PROCEDURE — 700105 HCHG RX REV CODE 258: Performed by: SURGERY

## 2018-07-23 PROCEDURE — G8996 SWALLOW CURRENT STATUS: HCPCS | Mod: CL

## 2018-07-23 PROCEDURE — 84100 ASSAY OF PHOSPHORUS: CPT

## 2018-07-23 PROCEDURE — 85025 COMPLETE CBC W/AUTO DIFF WBC: CPT

## 2018-07-23 PROCEDURE — 700101 HCHG RX REV CODE 250: Performed by: SURGERY

## 2018-07-23 PROCEDURE — 84134 ASSAY OF PREALBUMIN: CPT

## 2018-07-23 PROCEDURE — 83735 ASSAY OF MAGNESIUM: CPT

## 2018-07-23 PROCEDURE — 94668 MNPJ CHEST WALL SBSQ: CPT

## 2018-07-23 PROCEDURE — 770022 HCHG ROOM/CARE - ICU (200)

## 2018-07-23 PROCEDURE — 99233 SBSQ HOSP IP/OBS HIGH 50: CPT | Performed by: SURGERY

## 2018-07-23 PROCEDURE — L0150 CERV SEMI-RIG ADJ MOLDED CHN: HCPCS

## 2018-07-23 RX ADMIN — DOCUSATE SODIUM 100 MG: 50 LIQUID ORAL at 06:05

## 2018-07-23 RX ADMIN — POTASSIUM PHOSPHATE, MONOBASIC AND POTASSIUM PHOSPHATE, DIBASIC 30 MMOL: 224; 236 INJECTION, SOLUTION INTRAVENOUS at 08:25

## 2018-07-23 RX ADMIN — Medication 1 EACH: at 18:43

## 2018-07-23 RX ADMIN — Medication 1 EACH: at 06:05

## 2018-07-23 RX ADMIN — FAMOTIDINE 20 MG: 20 TABLET ORAL at 06:06

## 2018-07-23 RX ADMIN — POTASSIUM BICARBONATE 25 MEQ: 25 TABLET, EFFERVESCENT ORAL at 06:05

## 2018-07-23 RX ADMIN — ENOXAPARIN SODIUM 30 MG: 100 INJECTION SUBCUTANEOUS at 18:43

## 2018-07-23 RX ADMIN — MIDODRINE HYDROCHLORIDE 10 MG: 5 TABLET ORAL at 11:52

## 2018-07-23 RX ADMIN — MIDODRINE HYDROCHLORIDE 10 MG: 5 TABLET ORAL at 06:06

## 2018-07-23 RX ADMIN — ENOXAPARIN SODIUM 30 MG: 100 INJECTION SUBCUTANEOUS at 06:05

## 2018-07-23 RX ADMIN — SERTRALINE 100 MG: 100 TABLET, FILM COATED ORAL at 06:05

## 2018-07-23 RX ADMIN — IRON SUCROSE 200 MG: 20 INJECTION, SOLUTION INTRAVENOUS at 06:05

## 2018-07-23 ASSESSMENT — PAIN SCALES - GENERAL
PAINLEVEL_OUTOF10: 0
PAINLEVEL_OUTOF10: 2
PAINLEVEL_OUTOF10: 0
PAINLEVEL_OUTOF10: 2
PAINLEVEL_OUTOF10: 0
PAINLEVEL_OUTOF10: 0

## 2018-07-23 ASSESSMENT — ENCOUNTER SYMPTOMS
BACK PAIN: 0
NECK PAIN: 0
FEVER: 0
SENSORY CHANGE: 1
NAUSEA: 0
MYALGIAS: 0
SHORTNESS OF BREATH: 0

## 2018-07-23 NOTE — PROGRESS NOTES
TLSO off the shelf was delivered, fitted to patient, and left at bedside .  If any further assistance needed, please call extension [phone] or place order for Ortho Technician assistance as a communication order in [place].
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
## 2018-07-23 NOTE — PROGRESS NOTES
Neurosurgery Progress Note  POD#3 L2 corpectomy (transthoracic approach)  POD#6 C6-7 ACDF/ PSF  POD#6 T12-L4 PSF     Off propopol  Follows commands   3/5 weakly in BUEs  0/5 in BLEs  Absent sensation below umbilicus  Wearing Aspen collar  Dressing dry      Pulse  Av.6  Min: 70  Max: 92  Resp  Av.4  Min: 9  Max: 34  Monitored Temp 2  Av.9 °C (100.3 °F)  Min: 37.5 °C (99.5 °F)  Max: 38.2 °C (100.8 °F)  SpO2  Av %  Min: 92 %  Max: 100 %    No Data Recorded    Recent Labs      18   0250  18   0500  18   0406   WBC  9.6  10.8  11.1*   RBC  2.82*  2.72*  2.66*   HEMOGLOBIN  8.5*  8.2*  8.0*   HEMATOCRIT  24.8*  24.8*  24.8*   MCV  87.9  91.2  93.2   MCH  30.1  30.1  30.1   MCHC  34.3  33.1*  32.3*   RDW  54.9*  55.6*  57.6*   PLATELETCT  95*  89*  121*   MPV  11.0  10.8  10.7     Recent Labs      18   0505  18   0500  18   0406   SODIUM  144  144  145   POTASSIUM  3.6  3.5*  3.7   CHLORIDE  111  111  112   CO2  26  28  26   GLUCOSE  124*  122*  135*   BUN  17  22  24*   CREATININE  0.91  0.78  0.80   CALCIUM  7.7*  7.5*  7.7*               Intake/Output       18 - 18 0659 18 - 18 0659       Total  Total       Intake    I.V.  360  360 720  --  -- --    IV Volume (Maintence Fluid) 360 360 720 -- -- --    IV Volume (Fentanyl) 0 -- 0 -- -- --    Other  200  120 320  --  -- --    Medications (P.O./ Enteral Liquids) 200 120 320 -- -- --    Enteral  720  720 1440  --  -- --    Intake (mL) (Enteral Tube 10 Polish  Right Nare Cortrak Small Bowel Feeding Tube)  -- -- --    Total Intake 1280 1200 2480 -- -- --       Output    Urine  1025  1045 2070  --  -- --    Output (mL) (Urinary Catheter Indwelling Catheter) 1025 1045 2070 -- -- --    Stool  --  -- --  --  -- --    Number of Times Stooled 0 x -- 0 x -- -- --    Total Output 1025 1045 0 -- -- --       Net I/O     255 155 410 -- -- --             Intake/Output Summary (Last 24 hours) at 07/23/18 0817  Last data filed at 07/23/18 0600   Gross per 24 hour   Intake             2300 ml   Output             1895 ml   Net              405 ml            • potassium phosphate ivpb  30 mmol Once   • enoxaparin (LOVENOX) injection  30 mg Q12HRS   • potassium bicarbonate  25 mEq BID   • iron sucrose  200 mg DAILY   • oxyCODONE immediate-release  10 mg Q4HRS PRN   • sertraline  100 mg DAILY   • Pharmacy Consult Request  1 Each PRN   • MD ALERT...Do not administer NSAIDS or ASPIRIN unless ORDERED By Neurosurgery  1 Each PRN   • senna-docusate  1 Tab Nightly   • senna-docusate  1 Tab Q24HRS PRN   • polyethylene glycol/lytes  1 Packet BID PRN   • magnesium hydroxide  30 mL QDAY PRN   • bisacodyl  10 mg Q24HRS PRN   • fleet  1 Each Once PRN   • diphenhydrAMINE  25 mg Q6HRS PRN    Or   • diphenhydrAMINE  25 mg Q6HRS PRN   • ondansetron  4 mg Q4HRS PRN   • ondansetron  4 mg Q4HRS PRN   • promethazine  12.5-25 mg Q4HRS PRN   • promethazine  12.5-25 mg Q4HRS PRN   • prochlorperazine  5-10 mg Q4HRS PRN   • benzocaine-menthol  1 Lozenge Q2HRS PRN   • midodrine  10 mg TID   • LR   Continuous   • Respiratory Care per Protocol   Continuous RT   • docusate sodium 100mg/10mL  100 mg BID   • acetaminophen  650 mg Q4HRS PRN    Or   • acetaminophen  650 mg Q4HRS PRN   • propofol  0-80 mcg/kg/min Continuous   • fentaNYL   Continuous   • fentaNYL  25 mcg Q HOUR PRN    Or   • fentaNYL  50 mcg Q HOUR PRN    Or   • fentaNYL  100 mcg Q HOUR PRN   • bacitracin-polymyxin b   BID   • famotidine  20 mg BID    Or   • famotidine  20 mg BID       Assessment and Plan:  POD #3 / 6 / 6  Postop CT L spine looks good   C collar AAT  TLSO when OOB  Will continue to follow     Prophylactic anticoagulation: yes         Start date/time: start today

## 2018-07-23 NOTE — DISCHARGE PLANNING
Physiatry Dr. Daniel recommending pt will need intensive inpatient rehab following medical stabilization. When clinically appropriate, pt should be evaluated by PT/OT/SLP therapies.     Chart notes reflect choice for [facility].
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
## 2018-07-23 NOTE — CARE PLAN
Problem: Communication  Goal: The ability to communicate needs accurately and effectively will improve  Outcome: PROGRESSING AS EXPECTED  Pt alert and oriented, abler to communicate needs clearly.    Problem: Pain Management  Goal: Pain level will decrease to patient's comfort goal  Outcome: PROGRESSING AS EXPECTED  Pt denies pain at this time. Will continue to reassess.

## 2018-07-23 NOTE — CARE PLAN
Problem: Oxygenation:  Goal: Maintain adequate oxygenation dependent on patient condition  Outcome: PROGRESSING AS EXPECTED      Problem: Hyperinflation:  Goal: Prevent or improve atelectasis  Outcome: PROGRESSING AS EXPECTED

## 2018-07-23 NOTE — PROGRESS NOTES
Cortrak Placement    Tube Team verified patient name and medical record number prior to tube placement.  Cortrak tube (55 inches, 10 Swiss) placed at 95 cm in left nare.  Per Cortrak picture, tube appears to be in the small bowel.  Nursing Instructions: Awaiting KUB to confirm placement before use for medications or feeding. Once placement confirmed, flush tube with 30 ml of water, and then remove and save stylet, in patient medication drawer.
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
## 2018-07-23 NOTE — THERAPY
"Speech Language Therapy Clinical Swallow Evaluation completed.  Functional Status: The patient was seen for clinical swallow evaluation this date. The patient was awake, alert and but minimally responsive to clinician. The patient was able to follow oral motor directives with no gross deficits appreciated but some difficulty ?apraxia noted. The patient was given PO trials of ice chips, nectars, purees, and thin liquids. The patient consumed PO trials of thin liquids via 1/2tsp by spoon with no overt s/s of aspiration however, as amount was increased s/s became increasingly more consistent. The patient consumed NTL via teaspon, cup and straw sip with no overt s/s concerning for aspiration for initial 2oz however, exhibited overt s/s of aspiration as session progressed. AT this time, patient presents with s/s of oropharyngeal dysphagia and is not safe for PO alimentation. REC: NPO/TF with dysphagia therapy and consideration of FEES as medically appropriate. Thank you.     Recommendations - Diet: NPO, Pre-Feeding Trials with SLP Only                          Strategies: Direct supervision during meals and Head of Bed at 90 Degrees                          Medication Administration: Via Gastric Tube    Plan of Care: Will benefit from Speech Therapy 5 times per week  Post-Acute Therapy: Discharge to a transitional care facility for continued skilled therapy services.    See \"Rehab Therapy-Acute\" Patient Summary Report for complete documentation.   "

## 2018-07-23 NOTE — CARE PLAN
Problem: Safety  Goal: Will remain free from injury    Intervention: Provide assistance with mobility  Bedrest order until brace arrives

## 2018-07-24 ENCOUNTER — APPOINTMENT (OUTPATIENT)
Dept: RADIOLOGY | Facility: MEDICAL CENTER | Age: 58
DRG: 956 | End: 2018-07-24
Attending: SURGERY
Payer: COMMERCIAL

## 2018-07-24 LAB
ALBUMIN SERPL BCP-MCNC: 2.7 G/DL (ref 3.2–4.9)
ALBUMIN/GLOB SERPL: 1 G/DL
ALP SERPL-CCNC: 43 U/L (ref 30–99)
ALT SERPL-CCNC: 41 U/L (ref 2–50)
ANION GAP SERPL CALC-SCNC: 7 MMOL/L (ref 0–11.9)
AST SERPL-CCNC: 40 U/L (ref 12–45)
BASOPHILS # BLD AUTO: 0.1 % (ref 0–1.8)
BASOPHILS # BLD: 0.01 K/UL (ref 0–0.12)
BILIRUB SERPL-MCNC: 0.9 MG/DL (ref 0.1–1.5)
BUN SERPL-MCNC: 22 MG/DL (ref 8–22)
CALCIUM SERPL-MCNC: 7.9 MG/DL (ref 8.5–10.5)
CHLORIDE SERPL-SCNC: 111 MMOL/L (ref 96–112)
CO2 SERPL-SCNC: 24 MMOL/L (ref 20–33)
CREAT SERPL-MCNC: 0.78 MG/DL (ref 0.5–1.4)
EOSINOPHIL # BLD AUTO: 0.28 K/UL (ref 0–0.51)
EOSINOPHIL NFR BLD: 2.8 % (ref 0–6.9)
ERYTHROCYTE [DISTWIDTH] IN BLOOD BY AUTOMATED COUNT: 55.9 FL (ref 35.9–50)
GLOBULIN SER CALC-MCNC: 2.7 G/DL (ref 1.9–3.5)
GLUCOSE SERPL-MCNC: 108 MG/DL (ref 65–99)
HCT VFR BLD AUTO: 24.6 % (ref 42–52)
HGB BLD-MCNC: 8 G/DL (ref 14–18)
IMM GRANULOCYTES # BLD AUTO: 0.22 K/UL (ref 0–0.11)
IMM GRANULOCYTES NFR BLD AUTO: 2.2 % (ref 0–0.9)
LYMPHOCYTES # BLD AUTO: 1.15 K/UL (ref 1–4.8)
LYMPHOCYTES NFR BLD: 11.3 % (ref 22–41)
MCH RBC QN AUTO: 30.2 PG (ref 27–33)
MCHC RBC AUTO-ENTMCNC: 32.5 G/DL (ref 33.7–35.3)
MCV RBC AUTO: 92.8 FL (ref 81.4–97.8)
MONOCYTES # BLD AUTO: 0.99 K/UL (ref 0–0.85)
MONOCYTES NFR BLD AUTO: 9.7 % (ref 0–13.4)
NEUTROPHILS # BLD AUTO: 7.52 K/UL (ref 1.82–7.42)
NEUTROPHILS NFR BLD: 73.9 % (ref 44–72)
NRBC # BLD AUTO: 0 K/UL
NRBC BLD-RTO: 0 /100 WBC
PHOSPHATE SERPL-MCNC: 2 MG/DL (ref 2.5–4.5)
PLATELET # BLD AUTO: 180 K/UL (ref 164–446)
PMV BLD AUTO: 10.3 FL (ref 9–12.9)
POTASSIUM SERPL-SCNC: 3.7 MMOL/L (ref 3.6–5.5)
PROT SERPL-MCNC: 5.4 G/DL (ref 6–8.2)
RBC # BLD AUTO: 2.65 M/UL (ref 4.7–6.1)
SODIUM SERPL-SCNC: 142 MMOL/L (ref 135–145)
WBC # BLD AUTO: 10.2 K/UL (ref 4.8–10.8)

## 2018-07-24 PROCEDURE — A9270 NON-COVERED ITEM OR SERVICE: HCPCS | Performed by: SURGERY

## 2018-07-24 PROCEDURE — 700111 HCHG RX REV CODE 636 W/ 250 OVERRIDE (IP): Performed by: SURGERY

## 2018-07-24 PROCEDURE — G8988 SELF CARE GOAL STATUS: HCPCS | Mod: CK

## 2018-07-24 PROCEDURE — 700101 HCHG RX REV CODE 250: Performed by: SURGERY

## 2018-07-24 PROCEDURE — G8978 MOBILITY CURRENT STATUS: HCPCS | Mod: CM

## 2018-07-24 PROCEDURE — 700102 HCHG RX REV CODE 250 W/ 637 OVERRIDE(OP): Performed by: SURGERY

## 2018-07-24 PROCEDURE — 700105 HCHG RX REV CODE 258: Performed by: SURGERY

## 2018-07-24 PROCEDURE — 93971 EXTREMITY STUDY: CPT | Mod: 26 | Performed by: SURGERY

## 2018-07-24 PROCEDURE — 700112 HCHG RX REV CODE 229: Performed by: SURGERY

## 2018-07-24 PROCEDURE — 770022 HCHG ROOM/CARE - ICU (200)

## 2018-07-24 PROCEDURE — G8987 SELF CARE CURRENT STATUS: HCPCS | Mod: CM

## 2018-07-24 PROCEDURE — 97167 OT EVAL HIGH COMPLEX 60 MIN: CPT

## 2018-07-24 PROCEDURE — 85025 COMPLETE CBC W/AUTO DIFF WBC: CPT

## 2018-07-24 PROCEDURE — 97161 PT EVAL LOW COMPLEX 20 MIN: CPT

## 2018-07-24 PROCEDURE — G8979 MOBILITY GOAL STATUS: HCPCS | Mod: CK

## 2018-07-24 PROCEDURE — 84100 ASSAY OF PHOSPHORUS: CPT

## 2018-07-24 PROCEDURE — 700102 HCHG RX REV CODE 250 W/ 637 OVERRIDE(OP): Performed by: PHYSICIAN ASSISTANT

## 2018-07-24 PROCEDURE — 80053 COMPREHEN METABOLIC PANEL: CPT

## 2018-07-24 PROCEDURE — 93971 EXTREMITY STUDY: CPT

## 2018-07-24 PROCEDURE — 99233 SBSQ HOSP IP/OBS HIGH 50: CPT | Performed by: SURGERY

## 2018-07-24 PROCEDURE — 94669 MECHANICAL CHEST WALL OSCILL: CPT

## 2018-07-24 PROCEDURE — 71045 X-RAY EXAM CHEST 1 VIEW: CPT

## 2018-07-24 PROCEDURE — A9270 NON-COVERED ITEM OR SERVICE: HCPCS | Performed by: PHYSICIAN ASSISTANT

## 2018-07-24 PROCEDURE — 94668 MNPJ CHEST WALL SBSQ: CPT

## 2018-07-24 RX ORDER — BISACODYL 10 MG
10 SUPPOSITORY, RECTAL RECTAL DAILY
Status: DISCONTINUED | OUTPATIENT
Start: 2018-07-24 | End: 2018-07-31 | Stop reason: HOSPADM

## 2018-07-24 RX ADMIN — SERTRALINE 100 MG: 100 TABLET, FILM COATED ORAL at 06:22

## 2018-07-24 RX ADMIN — MAGNESIUM HYDROXIDE 30 ML: 400 SUSPENSION ORAL at 18:25

## 2018-07-24 RX ADMIN — POTASSIUM BICARBONATE 25 MEQ: 25 TABLET, EFFERVESCENT ORAL at 06:21

## 2018-07-24 RX ADMIN — POTASSIUM PHOSPHATE, MONOBASIC AND POTASSIUM PHOSPHATE, DIBASIC 30 MMOL: 224; 236 INJECTION, SOLUTION INTRAVENOUS at 11:09

## 2018-07-24 RX ADMIN — BISACODYL 10 MG: 10 SUPPOSITORY RECTAL at 22:39

## 2018-07-24 RX ADMIN — Medication 1 EACH: at 06:23

## 2018-07-24 RX ADMIN — MIDODRINE HYDROCHLORIDE 10 MG: 5 TABLET ORAL at 12:15

## 2018-07-24 RX ADMIN — IRON SUCROSE 200 MG: 20 INJECTION, SOLUTION INTRAVENOUS at 06:24

## 2018-07-24 RX ADMIN — BISACODYL 10 MG: 10 SUPPOSITORY RECTAL at 23:00

## 2018-07-24 RX ADMIN — ENOXAPARIN SODIUM 30 MG: 100 INJECTION SUBCUTANEOUS at 06:23

## 2018-07-24 RX ADMIN — DOCUSATE SODIUM 100 MG: 50 LIQUID ORAL at 06:23

## 2018-07-24 RX ADMIN — MIDODRINE HYDROCHLORIDE 10 MG: 5 TABLET ORAL at 18:25

## 2018-07-24 RX ADMIN — POTASSIUM BICARBONATE 25 MEQ: 25 TABLET, EFFERVESCENT ORAL at 18:25

## 2018-07-24 RX ADMIN — STANDARDIZED SENNA CONCENTRATE AND DOCUSATE SODIUM 1 TABLET: 8.6; 5 TABLET, FILM COATED ORAL at 20:16

## 2018-07-24 RX ADMIN — Medication 1 EACH: at 18:26

## 2018-07-24 RX ADMIN — ENOXAPARIN SODIUM 30 MG: 100 INJECTION SUBCUTANEOUS at 18:25

## 2018-07-24 ASSESSMENT — ENCOUNTER SYMPTOMS
EYES NEGATIVE: 1
NECK PAIN: 1
RESPIRATORY NEGATIVE: 1
VOMITING: 0
NAUSEA: 0
WEAKNESS: 1
ABDOMINAL PAIN: 0
BACK PAIN: 1
CARDIOVASCULAR NEGATIVE: 1

## 2018-07-24 ASSESSMENT — PAIN SCALES - GENERAL
PAINLEVEL_OUTOF10: 0

## 2018-07-24 ASSESSMENT — ACTIVITIES OF DAILY LIVING (ADL): TOILETING: INDEPENDENT

## 2018-07-24 ASSESSMENT — COGNITIVE AND FUNCTIONAL STATUS - GENERAL
DRESSING REGULAR UPPER BODY CLOTHING: TOTAL
DAILY ACTIVITIY SCORE: 7
MOBILITY SCORE: 6
EATING MEALS: TOTAL
MOVING FROM LYING ON BACK TO SITTING ON SIDE OF FLAT BED: UNABLE
MOVING TO AND FROM BED TO CHAIR: UNABLE
SUGGESTED CMS G CODE MODIFIER MOBILITY: CN
PERSONAL GROOMING: A LOT
SUGGESTED CMS G CODE MODIFIER DAILY ACTIVITY: CM
CLIMB 3 TO 5 STEPS WITH RAILING: TOTAL
TOILETING: TOTAL
WALKING IN HOSPITAL ROOM: TOTAL
STANDING UP FROM CHAIR USING ARMS: TOTAL
HELP NEEDED FOR BATHING: TOTAL
TURNING FROM BACK TO SIDE WHILE IN FLAT BAD: UNABLE
DRESSING REGULAR LOWER BODY CLOTHING: TOTAL

## 2018-07-24 ASSESSMENT — GAIT ASSESSMENTS: GAIT LEVEL OF ASSIST: UNABLE TO PARTICIPATE
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
## 2018-07-24 NOTE — PROGRESS NOTES
Cortrak Placement    Tube Team verified patient name and medical record number prior to tube placement.  Cortrak tube (43 inches, 10 Norwegian) placed at 65 cm in left nare.  Per Cortrak picture, tube appears to be in the stomach.  Nursing Instructions: Awaiting KUB to confirm placement before use for medications or feeding. Once placement confirmed, flush tube with 30 ml of water, and then remove and save stylet, in patient medication drawer.

## 2018-07-24 NOTE — CARE PLAN
Problem: Hyperinflation:  Goal: Prevent or improve atelectasis  PEP QID  IS 1000 mL  3 LPM NC, will continue to titrate as tolerated

## 2018-07-24 NOTE — CARE PLAN
"Problem: Bowel/Gastric:  Goal: Will not experience complications related to bowel motility  Outcome: PROGRESSING SLOWER THAN EXPECTED  Last bowel movement was prior to arrival. Bowel protocol initiated, daily digital stimulation ordered per Dr. Acosta. Patient states \"this is normal\" for him.     Problem: Respiratory:  Goal: Respiratory status will improve  Outcome: PROGRESSING AS EXPECTED  Patient understands importance of deep breathing and coughing. Effective use of IS       "
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
## 2018-07-24 NOTE — PROGRESS NOTES
Coretramary pulled by pt at 1400, replaced by POLY Iniguez at 1615. B/l soft wrist restraints initiated d/t pt pulling at lines and tubes. Currently awaiting KUB to confirm placement, at which point TF to be restarted.
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
## 2018-07-24 NOTE — PROGRESS NOTES
Neurosurgery Progress Note  POD#4 L2 corpectomy (transthoracic approach)  POD#7 C6-7 ACDF/ PSF  POD#7 T12-L4 PSF     Off propopol  Follows commands   3/5 weakly in BUEs  0/5 in BLEs  Absent sensation below umbilicus  Wearing Aspen collar  Dressing dry      Pulse  Av.1  Min: 69  Max: 88  Resp  Av.6  Min: 12  Max: 27  Monitored Temp 2  Av.9 °C (100.3 °F)  Min: 37.7 °C (99.9 °F)  Max: 38.2 °C (100.8 °F)  SpO2  Av.4 %  Min: 92 %  Max: 98 %    No Data Recorded    Recent Labs      18   0500  18   0406  18   0430   WBC  10.8  11.1*  10.2   RBC  2.72*  2.66*  2.65*   HEMOGLOBIN  8.2*  8.0*  8.0*   HEMATOCRIT  24.8*  24.8*  24.6*   MCV  91.2  93.2  92.8   MCH  30.1  30.1  30.2   MCHC  33.1*  32.3*  32.5*   RDW  55.6*  57.6*  55.9*   PLATELETCT  89*  121*  180   MPV  10.8  10.7  10.3     Recent Labs      18   0500  18   0406  18   0430   SODIUM  144  145  142   POTASSIUM  3.5*  3.7  3.7   CHLORIDE  111  112  111   CO2  28  26  24   GLUCOSE  122*  135*  108*   BUN  22  24*  22   CREATININE  0.78  0.80  0.78   CALCIUM  7.5*  7.7*  7.9*               Intake/Output       18 - 1859 18 - 18 0659       Total  Total       Intake    I.V.  860  360 1220  --  -- --    IV Piggyback Volume (IV Piggyback) 500 -- 500 -- -- --    IV Volume (Maintence Fluid) 360 360 720 -- -- --    Other  --  70 70  --  -- --    Medications (P.O./ Enteral Liquids) -- 70 70 -- -- --    Enteral  480  60 540  --  -- --    Free Water / Tube Flush -- 60 60 -- -- --    Intake (mL) ([REMOVED] Enteral Tube 10 Arabic  Right Nare Cortrak Small Bowel Feeding Tube) 480 -- 480 -- -- --    Total Intake 6950 435 8098 -- -- --       Output    Urine  1725  1800 3525  --  -- --    Output (mL) (Urinary Catheter Indwelling Catheter) 1725 1800 3525 -- -- --    Stool  --  -- --  --  -- --    Number of Times Stooled 0 x 0 x 0 x -- -- --    Total Output  1725 1800 3525 -- -- --       Net I/O     -385 -1310 -1695 -- -- --            Intake/Output Summary (Last 24 hours) at 07/24/18 0818  Last data filed at 07/24/18 0600   Gross per 24 hour   Intake             1150 ml   Output             3300 ml   Net            -2150 ml            • enoxaparin (LOVENOX) injection  30 mg Q12HRS   • potassium bicarbonate  25 mEq BID   • iron sucrose  200 mg DAILY   • oxyCODONE immediate-release  10 mg Q4HRS PRN   • sertraline  100 mg DAILY   • Pharmacy Consult Request  1 Each PRN   • MD ALERT...Do not administer NSAIDS or ASPIRIN unless ORDERED By Neurosurgery  1 Each PRN   • senna-docusate  1 Tab Nightly   • senna-docusate  1 Tab Q24HRS PRN   • polyethylene glycol/lytes  1 Packet BID PRN   • magnesium hydroxide  30 mL QDAY PRN   • bisacodyl  10 mg Q24HRS PRN   • fleet  1 Each Once PRN   • diphenhydrAMINE  25 mg Q6HRS PRN    Or   • diphenhydrAMINE  25 mg Q6HRS PRN   • ondansetron  4 mg Q4HRS PRN   • ondansetron  4 mg Q4HRS PRN   • promethazine  12.5-25 mg Q4HRS PRN   • promethazine  12.5-25 mg Q4HRS PRN   • prochlorperazine  5-10 mg Q4HRS PRN   • benzocaine-menthol  1 Lozenge Q2HRS PRN   • midodrine  10 mg TID   • LR   Continuous   • Respiratory Care per Protocol   Continuous RT   • docusate sodium 100mg/10mL  100 mg BID   • acetaminophen  650 mg Q4HRS PRN    Or   • acetaminophen  650 mg Q4HRS PRN   • fentaNYL  25 mcg Q HOUR PRN    Or   • fentaNYL  50 mcg Q HOUR PRN   • bacitracin-polymyxin b   BID       Assessment and Plan:  POD #4 / 7 / 7  Postop CT L spine looks good   C collar AAT  TLSO when OOB  Mobilize as able.  Will need rehab.  No further neurosurgical interventions planned.  Pt to follow up with [facility] 4 weeks post op with X-rays of cervical and thoracolumbar spine.  Contact [facility] prn.     Prophylactic anticoagulation: yes         Start date/time: start today

## 2018-07-25 ENCOUNTER — APPOINTMENT (OUTPATIENT)
Dept: RADIOLOGY | Facility: MEDICAL CENTER | Age: 58
DRG: 956 | End: 2018-07-25
Attending: SURGERY
Payer: COMMERCIAL

## 2018-07-25 PROBLEM — N31.9 NEUROGENIC BLADDER: Status: ACTIVE | Noted: 2018-07-25

## 2018-07-25 PROBLEM — K59.2 NEUROGENIC BOWEL: Status: ACTIVE | Noted: 2018-07-25

## 2018-07-25 PROBLEM — S22.24XA: Status: ACTIVE | Noted: 2018-07-25

## 2018-07-25 LAB
ALBUMIN SERPL BCP-MCNC: 2.9 G/DL (ref 3.2–4.9)
ALBUMIN/GLOB SERPL: 1.2 G/DL
ALP SERPL-CCNC: 57 U/L (ref 30–99)
ALT SERPL-CCNC: 106 U/L (ref 2–50)
ANION GAP SERPL CALC-SCNC: 5 MMOL/L (ref 0–11.9)
AST SERPL-CCNC: 84 U/L (ref 12–45)
BASOPHILS # BLD AUTO: 0.2 % (ref 0–1.8)
BASOPHILS # BLD: 0.02 K/UL (ref 0–0.12)
BILIRUB SERPL-MCNC: 1 MG/DL (ref 0.1–1.5)
BUN SERPL-MCNC: 25 MG/DL (ref 8–22)
CALCIUM SERPL-MCNC: 8 MG/DL (ref 8.5–10.5)
CHLORIDE SERPL-SCNC: 111 MMOL/L (ref 96–112)
CO2 SERPL-SCNC: 22 MMOL/L (ref 20–33)
CREAT SERPL-MCNC: 0.74 MG/DL (ref 0.5–1.4)
EOSINOPHIL # BLD AUTO: 0.45 K/UL (ref 0–0.51)
EOSINOPHIL NFR BLD: 4.3 % (ref 0–6.9)
ERYTHROCYTE [DISTWIDTH] IN BLOOD BY AUTOMATED COUNT: 53.8 FL (ref 35.9–50)
GLOBULIN SER CALC-MCNC: 2.4 G/DL (ref 1.9–3.5)
GLUCOSE SERPL-MCNC: 131 MG/DL (ref 65–99)
HCT VFR BLD AUTO: 26 % (ref 42–52)
HGB BLD-MCNC: 8.6 G/DL (ref 14–18)
IMM GRANULOCYTES # BLD AUTO: 0.36 K/UL (ref 0–0.11)
IMM GRANULOCYTES NFR BLD AUTO: 3.5 % (ref 0–0.9)
LYMPHOCYTES # BLD AUTO: 1.32 K/UL (ref 1–4.8)
LYMPHOCYTES NFR BLD: 12.7 % (ref 22–41)
MCH RBC QN AUTO: 30.1 PG (ref 27–33)
MCHC RBC AUTO-ENTMCNC: 33.1 G/DL (ref 33.7–35.3)
MCV RBC AUTO: 90.9 FL (ref 81.4–97.8)
MONOCYTES # BLD AUTO: 1.05 K/UL (ref 0–0.85)
MONOCYTES NFR BLD AUTO: 10.1 % (ref 0–13.4)
NEUTROPHILS # BLD AUTO: 7.18 K/UL (ref 1.82–7.42)
NEUTROPHILS NFR BLD: 69.2 % (ref 44–72)
NRBC # BLD AUTO: 0.02 K/UL
NRBC BLD-RTO: 0.2 /100 WBC
PHOSPHATE SERPL-MCNC: 2.1 MG/DL (ref 2.5–4.5)
PLATELET # BLD AUTO: 238 K/UL (ref 164–446)
PMV BLD AUTO: 10.3 FL (ref 9–12.9)
POTASSIUM SERPL-SCNC: 3.7 MMOL/L (ref 3.6–5.5)
PROT SERPL-MCNC: 5.3 G/DL (ref 6–8.2)
RBC # BLD AUTO: 2.86 M/UL (ref 4.7–6.1)
SODIUM SERPL-SCNC: 138 MMOL/L (ref 135–145)
WBC # BLD AUTO: 10.4 K/UL (ref 4.8–10.8)

## 2018-07-25 PROCEDURE — 92526 ORAL FUNCTION THERAPY: CPT

## 2018-07-25 PROCEDURE — 700101 HCHG RX REV CODE 250: Performed by: SURGERY

## 2018-07-25 PROCEDURE — 84100 ASSAY OF PHOSPHORUS: CPT

## 2018-07-25 PROCEDURE — 80053 COMPREHEN METABOLIC PANEL: CPT

## 2018-07-25 PROCEDURE — L4398 FOOT DROP SPLINT PRE OTS: HCPCS

## 2018-07-25 PROCEDURE — 770001 HCHG ROOM/CARE - MED/SURG/GYN PRIV*

## 2018-07-25 PROCEDURE — 700111 HCHG RX REV CODE 636 W/ 250 OVERRIDE (IP): Performed by: SURGERY

## 2018-07-25 PROCEDURE — 94668 MNPJ CHEST WALL SBSQ: CPT

## 2018-07-25 PROCEDURE — 85025 COMPLETE CBC W/AUTO DIFF WBC: CPT

## 2018-07-25 PROCEDURE — 700112 HCHG RX REV CODE 229: Performed by: SURGERY

## 2018-07-25 PROCEDURE — 700105 HCHG RX REV CODE 258: Performed by: SURGERY

## 2018-07-25 PROCEDURE — 94669 MECHANICAL CHEST WALL OSCILL: CPT

## 2018-07-25 PROCEDURE — 700102 HCHG RX REV CODE 250 W/ 637 OVERRIDE(OP): Performed by: PHYSICIAN ASSISTANT

## 2018-07-25 PROCEDURE — A9270 NON-COVERED ITEM OR SERVICE: HCPCS | Performed by: PHYSICIAN ASSISTANT

## 2018-07-25 PROCEDURE — A9270 NON-COVERED ITEM OR SERVICE: HCPCS | Performed by: SURGERY

## 2018-07-25 PROCEDURE — 99233 SBSQ HOSP IP/OBS HIGH 50: CPT | Performed by: SURGERY

## 2018-07-25 PROCEDURE — 700102 HCHG RX REV CODE 250 W/ 637 OVERRIDE(OP): Performed by: SURGERY

## 2018-07-25 PROCEDURE — E0191 PROTECTOR HEEL OR ELBOW: HCPCS | Performed by: SURGERY

## 2018-07-25 RX ORDER — OXYCODONE HYDROCHLORIDE 5 MG/1
5 TABLET ORAL EVERY 4 HOURS PRN
Status: DISCONTINUED | OUTPATIENT
Start: 2018-07-25 | End: 2018-07-31 | Stop reason: HOSPADM

## 2018-07-25 RX ADMIN — MIDODRINE HYDROCHLORIDE 10 MG: 5 TABLET ORAL at 17:47

## 2018-07-25 RX ADMIN — SERTRALINE 100 MG: 100 TABLET, FILM COATED ORAL at 05:56

## 2018-07-25 RX ADMIN — ENOXAPARIN SODIUM 30 MG: 100 INJECTION SUBCUTANEOUS at 17:47

## 2018-07-25 RX ADMIN — Medication 1 EACH: at 17:47

## 2018-07-25 RX ADMIN — POTASSIUM BICARBONATE 25 MEQ: 25 TABLET, EFFERVESCENT ORAL at 17:47

## 2018-07-25 RX ADMIN — MIDODRINE HYDROCHLORIDE 10 MG: 5 TABLET ORAL at 05:57

## 2018-07-25 RX ADMIN — STANDARDIZED SENNA CONCENTRATE AND DOCUSATE SODIUM 1 TABLET: 8.6; 5 TABLET, FILM COATED ORAL at 20:30

## 2018-07-25 RX ADMIN — Medication 1 EACH: at 05:57

## 2018-07-25 RX ADMIN — POTASSIUM BICARBONATE 25 MEQ: 25 TABLET, EFFERVESCENT ORAL at 05:57

## 2018-07-25 RX ADMIN — IRON SUCROSE 200 MG: 20 INJECTION, SOLUTION INTRAVENOUS at 06:00

## 2018-07-25 RX ADMIN — POLYETHYLENE GLYCOL 3350 1 PACKET: 17 POWDER, FOR SOLUTION ORAL at 17:47

## 2018-07-25 RX ADMIN — MIDODRINE HYDROCHLORIDE 10 MG: 5 TABLET ORAL at 12:06

## 2018-07-25 RX ADMIN — POTASSIUM PHOSPHATE, MONOBASIC AND POTASSIUM PHOSPHATE, DIBASIC 30 MMOL: 224; 236 INJECTION, SOLUTION INTRAVENOUS at 12:07

## 2018-07-25 RX ADMIN — ENOXAPARIN SODIUM 30 MG: 100 INJECTION SUBCUTANEOUS at 05:58

## 2018-07-25 RX ADMIN — DOCUSATE SODIUM 100 MG: 50 LIQUID ORAL at 05:56

## 2018-07-25 ASSESSMENT — ENCOUNTER SYMPTOMS
HEADACHES: 0
COUGH: 0
FOCAL WEAKNESS: 1
DOUBLE VISION: 0
BACK PAIN: 1
SPUTUM PRODUCTION: 0
VOMITING: 0
SENSORY CHANGE: 1
CONSTIPATION: 0
BLURRED VISION: 0
TINGLING: 0
NAUSEA: 0
ABDOMINAL PAIN: 0
DIZZINESS: 0
ROS GI COMMENTS: BM 7/25
CARDIOVASCULAR NEGATIVE: 1
NECK PAIN: 1
PALPITATIONS: 0
SHORTNESS OF BREATH: 0
FEVER: 0
WEAKNESS: 1
SPEECH CHANGE: 0
MYALGIAS: 0
CHILLS: 0

## 2018-07-25 ASSESSMENT — PAIN SCALES - GENERAL
PAINLEVEL_OUTOF10: 0

## 2018-07-25 ASSESSMENT — LIFESTYLE VARIABLES: SUBSTANCE_ABUSE: 0

## 2018-07-25 NOTE — THERAPY
"Speech Language Therapy dysphagia treatment completed.   Functional Status:  The patient was seen for dysphagia therapy this date. The patient was awake, alert and agreeable to dysphagia therapy. Patient had TLSO and C-collar on and HOB was elevated for PO trials. The patient continues to present with no overt s/s of aspiration during PO trials with delayed/persistent cough following PO trials. Patient exhibited difficulty follow dysphagia exercises this session. Given overall s/s noted at bedside in addition to current medical status, recommend patient remain NPO pending FEES to r/o aspiration and further assess oropharyngeal swallow function for safe PO alimentation.     Recommendations: 1) NPO/TF pending FEES.     Plan of Care: Will benefit from Speech Therapy 5 times per week.    Post-Acute Therapy: Discharge to a transitional care facility for continued skilled therapy services.    See \"Rehab Therapy-Acute\" Patient Summary Report for complete documentation.     "

## 2018-07-25 NOTE — PROGRESS NOTES
SHARYN santa applied to pt LLE per RN request,  Rn informed that prevalon boots are from pt supply

## 2018-07-25 NOTE — CARE PLAN
Problem: Oxygenation:  Goal: Maintain adequate oxygenation dependent on patient condition  Outcome: PROGRESSING AS EXPECTED  3L NC    Problem: Hyperinflation:  Goal: Prevent or improve atelectasis  Outcome: PROGRESSING AS EXPECTED    Intervention: Instruct incentive spirometry usage  PEP QID, IS 1250

## 2018-07-25 NOTE — CARE PLAN
Problem: Venous Thromboembolism (VTW)/Deep Vein Thrombosis (DVT) Prevention:  Goal: Patient will participate in Venous Thrombosis (VTE)/Deep Vein Thrombosis (DVT)Prevention Measures  Outcome: PROGRESSING AS EXPECTED  SCD'd in place, DVT pharmacologic interventions in place     Problem: Mobility  Goal: Risk for activity intolerance will decrease  Outcome: PROGRESSING AS EXPECTED  Patient getting to edge of bed with assistance. TSLO in place when out of bed
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
## 2018-07-25 NOTE — DISCHARGE PLANNING
Medical Social Work     SW spoke to Clair with [facility] who stated that they are going to start working on authorization through pt's insurance for pt to come to their facility. Clair provided this SW with pt's RN CM Zari contact through pt's insurance [phone]. HI left  requesting call back in regards to transportation and what they will authorize. Clair is requesting that a MD to MD be complete. Pt will be transferring to floor. Pt will also need a Swallow evaluation to determine if PEG tube will be needed prior to transfer. HI updated unit HI on pt's d/c plan.     Plan: MD to MD will need to be complete prior to transfer to [facility].

## 2018-07-25 NOTE — CARE PLAN
Problem: Fluid Volume:  Goal: Will maintain balanced intake and output  Outcome: PROGRESSING AS EXPECTED  Pt's intake and output will be monitored and balanced.    Problem: Neuro Status  Goal: Monitor neuro status and rapid identification of neuro changes  Outcome: PROGRESSING AS EXPECTED  Pt's neuro status will be assessed every 4 hours.
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
## 2018-07-26 ENCOUNTER — APPOINTMENT (OUTPATIENT)
Dept: RADIOLOGY | Facility: MEDICAL CENTER | Age: 58
DRG: 956 | End: 2018-07-26
Attending: SURGERY
Payer: COMMERCIAL

## 2018-07-26 ENCOUNTER — APPOINTMENT (OUTPATIENT)
Dept: RADIOLOGY | Facility: MEDICAL CENTER | Age: 58
DRG: 956 | End: 2018-07-26
Attending: NURSE PRACTITIONER
Payer: COMMERCIAL

## 2018-07-26 PROBLEM — H49.22: Status: ACTIVE | Noted: 2018-07-26

## 2018-07-26 PROBLEM — R74.8 ELEVATED LIVER ENZYMES: Status: ACTIVE | Noted: 2018-07-26

## 2018-07-26 PROBLEM — R53.81 DEBILITY: Status: ACTIVE | Noted: 2018-07-26

## 2018-07-26 LAB
ALBUMIN SERPL BCP-MCNC: 3.2 G/DL (ref 3.2–4.9)
ALBUMIN/GLOB SERPL: 1.4 G/DL
ALP SERPL-CCNC: 68 U/L (ref 30–99)
ALT SERPL-CCNC: 126 U/L (ref 2–50)
ANION GAP SERPL CALC-SCNC: 8 MMOL/L (ref 0–11.9)
AST SERPL-CCNC: 76 U/L (ref 12–45)
BASOPHILS # BLD AUTO: 0.3 % (ref 0–1.8)
BASOPHILS # BLD: 0.04 K/UL (ref 0–0.12)
BILIRUB SERPL-MCNC: 1.1 MG/DL (ref 0.1–1.5)
BUN SERPL-MCNC: 26 MG/DL (ref 8–22)
CALCIUM SERPL-MCNC: 8.2 MG/DL (ref 8.5–10.5)
CHLORIDE SERPL-SCNC: 108 MMOL/L (ref 96–112)
CO2 SERPL-SCNC: 22 MMOL/L (ref 20–33)
CREAT SERPL-MCNC: 0.8 MG/DL (ref 0.5–1.4)
EOSINOPHIL # BLD AUTO: 0.42 K/UL (ref 0–0.51)
EOSINOPHIL NFR BLD: 3.4 % (ref 0–6.9)
ERYTHROCYTE [DISTWIDTH] IN BLOOD BY AUTOMATED COUNT: 57.4 FL (ref 35.9–50)
GLOBULIN SER CALC-MCNC: 2.3 G/DL (ref 1.9–3.5)
GLUCOSE SERPL-MCNC: 110 MG/DL (ref 65–99)
HCT VFR BLD AUTO: 28.1 % (ref 42–52)
HGB BLD-MCNC: 9 G/DL (ref 14–18)
IMM GRANULOCYTES # BLD AUTO: 0.35 K/UL (ref 0–0.11)
IMM GRANULOCYTES NFR BLD AUTO: 2.8 % (ref 0–0.9)
LYMPHOCYTES # BLD AUTO: 1.85 K/UL (ref 1–4.8)
LYMPHOCYTES NFR BLD: 15 % (ref 22–41)
MAGNESIUM SERPL-MCNC: 2.3 MG/DL (ref 1.5–2.5)
MCH RBC QN AUTO: 30.2 PG (ref 27–33)
MCHC RBC AUTO-ENTMCNC: 32 G/DL (ref 33.7–35.3)
MCV RBC AUTO: 94.3 FL (ref 81.4–97.8)
MONOCYTES # BLD AUTO: 1.23 K/UL (ref 0–0.85)
MONOCYTES NFR BLD AUTO: 10 % (ref 0–13.4)
NEUTROPHILS # BLD AUTO: 8.43 K/UL (ref 1.82–7.42)
NEUTROPHILS NFR BLD: 68.5 % (ref 44–72)
NRBC # BLD AUTO: 0.03 K/UL
NRBC BLD-RTO: 0.2 /100 WBC
PHOSPHATE SERPL-MCNC: 2.8 MG/DL (ref 2.5–4.5)
PLATELET # BLD AUTO: 296 K/UL (ref 164–446)
PMV BLD AUTO: 10.4 FL (ref 9–12.9)
POTASSIUM SERPL-SCNC: 3.8 MMOL/L (ref 3.6–5.5)
PROT SERPL-MCNC: 5.5 G/DL (ref 6–8.2)
RBC # BLD AUTO: 2.98 M/UL (ref 4.7–6.1)
SODIUM SERPL-SCNC: 138 MMOL/L (ref 135–145)
WBC # BLD AUTO: 12.3 K/UL (ref 4.8–10.8)

## 2018-07-26 PROCEDURE — 94668 MNPJ CHEST WALL SBSQ: CPT

## 2018-07-26 PROCEDURE — 700111 HCHG RX REV CODE 636 W/ 250 OVERRIDE (IP): Performed by: SURGERY

## 2018-07-26 PROCEDURE — A9270 NON-COVERED ITEM OR SERVICE: HCPCS | Performed by: PHYSICIAN ASSISTANT

## 2018-07-26 PROCEDURE — 36415 COLL VENOUS BLD VENIPUNCTURE: CPT

## 2018-07-26 PROCEDURE — A9270 NON-COVERED ITEM OR SERVICE: HCPCS | Performed by: NURSE PRACTITIONER

## 2018-07-26 PROCEDURE — 700101 HCHG RX REV CODE 250: Performed by: SURGERY

## 2018-07-26 PROCEDURE — 97535 SELF CARE MNGMENT TRAINING: CPT

## 2018-07-26 PROCEDURE — A9270 NON-COVERED ITEM OR SERVICE: HCPCS | Performed by: SURGERY

## 2018-07-26 PROCEDURE — 700102 HCHG RX REV CODE 250 W/ 637 OVERRIDE(OP): Performed by: NURSE PRACTITIONER

## 2018-07-26 PROCEDURE — 85025 COMPLETE CBC W/AUTO DIFF WBC: CPT

## 2018-07-26 PROCEDURE — 84100 ASSAY OF PHOSPHORUS: CPT

## 2018-07-26 PROCEDURE — 700112 HCHG RX REV CODE 229: Performed by: SURGERY

## 2018-07-26 PROCEDURE — 700102 HCHG RX REV CODE 250 W/ 637 OVERRIDE(OP): Performed by: SURGERY

## 2018-07-26 PROCEDURE — G8996 SWALLOW CURRENT STATUS: HCPCS | Mod: CK

## 2018-07-26 PROCEDURE — 92612 ENDOSCOPY SWALLOW (FEES) VID: CPT

## 2018-07-26 PROCEDURE — 770001 HCHG ROOM/CARE - MED/SURG/GYN PRIV*

## 2018-07-26 PROCEDURE — 80053 COMPREHEN METABOLIC PANEL: CPT

## 2018-07-26 PROCEDURE — 83735 ASSAY OF MAGNESIUM: CPT

## 2018-07-26 PROCEDURE — 700102 HCHG RX REV CODE 250 W/ 637 OVERRIDE(OP): Performed by: PHYSICIAN ASSISTANT

## 2018-07-26 PROCEDURE — 71045 X-RAY EXAM CHEST 1 VIEW: CPT

## 2018-07-26 PROCEDURE — G8997 SWALLOW GOAL STATUS: HCPCS | Mod: CK

## 2018-07-26 PROCEDURE — 97530 THERAPEUTIC ACTIVITIES: CPT

## 2018-07-26 RX ADMIN — DOCUSATE SODIUM 100 MG: 50 LIQUID ORAL at 17:11

## 2018-07-26 RX ADMIN — ENOXAPARIN SODIUM 30 MG: 100 INJECTION SUBCUTANEOUS at 17:11

## 2018-07-26 RX ADMIN — POTASSIUM BICARBONATE 25 MEQ: 25 TABLET, EFFERVESCENT ORAL at 17:11

## 2018-07-26 RX ADMIN — OXYCODONE HYDROCHLORIDE 5 MG: 5 TABLET ORAL at 21:16

## 2018-07-26 RX ADMIN — STANDARDIZED SENNA CONCENTRATE AND DOCUSATE SODIUM 1 TABLET: 8.6; 5 TABLET, FILM COATED ORAL at 21:16

## 2018-07-26 RX ADMIN — IRON SUCROSE 200 MG: 20 INJECTION, SOLUTION INTRAVENOUS at 05:10

## 2018-07-26 RX ADMIN — Medication 1 EACH: at 17:11

## 2018-07-26 RX ADMIN — MIDODRINE HYDROCHLORIDE 10 MG: 5 TABLET ORAL at 17:11

## 2018-07-26 RX ADMIN — Medication 1 EACH: at 05:10

## 2018-07-26 ASSESSMENT — ENCOUNTER SYMPTOMS
VOMITING: 0
ROS GI COMMENTS: BM 7/25
BLURRED VISION: 0
BACK PAIN: 0
WEAKNESS: 1
ABDOMINAL PAIN: 0
TINGLING: 0
DOUBLE VISION: 0
SHORTNESS OF BREATH: 0
SENSORY CHANGE: 1
FOCAL WEAKNESS: 1
DIZZINESS: 0
NAUSEA: 0
CONSTIPATION: 0
FEVER: 0
SPEECH CHANGE: 0
CONSTITUTIONAL NEGATIVE: 1
NECK PAIN: 0
MYALGIAS: 0
CHILLS: 0
SENSORY CHANGE: 0
HEADACHES: 0

## 2018-07-26 ASSESSMENT — PAIN SCALES - GENERAL
PAINLEVEL_OUTOF10: 0
PAINLEVEL_OUTOF10: 6
PAINLEVEL_OUTOF10: 0

## 2018-07-26 ASSESSMENT — COGNITIVE AND FUNCTIONAL STATUS - GENERAL
EATING MEALS: A LOT
DRESSING REGULAR UPPER BODY CLOTHING: TOTAL
HELP NEEDED FOR BATHING: TOTAL
PERSONAL GROOMING: TOTAL
SUGGESTED CMS G CODE MODIFIER DAILY ACTIVITY: CM
TOILETING: TOTAL
DAILY ACTIVITIY SCORE: 7
DRESSING REGULAR LOWER BODY CLOTHING: TOTAL

## 2018-07-26 ASSESSMENT — LIFESTYLE VARIABLES: SUBSTANCE_ABUSE: 0
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
## 2018-07-26 NOTE — CARE PLAN
Problem: Safety  Goal: Will remain free from injury  Outcome: PROGRESSING AS EXPECTED  Pt has remained free from injury this visit.    Problem: Infection  Goal: Will remain free from infection  Outcome: PROGRESSING AS EXPECTED  No s/s of infection.

## 2018-07-26 NOTE — PROGRESS NOTES
RN notified MANDY Zendejas of pt pulling out cortrak twice last night. RN spoke to SLP and per SLP, wait for FEE to be done around 1100 today prior to placing another cortrak.

## 2018-07-26 NOTE — PROGRESS NOTES
"Pt pulled out cortrak. Refusing placement at this time, wants to \"wait until the morning\". Pt has 0600 meds, will place cortrak around 0500.     Spoke with NPCheryl, and got an order for a bridle to be placed with next cortrak.  "

## 2018-07-26 NOTE — DISCHARGE PLANNING
Medical Social Work    SW spoke to Zari Poole RN, CM with pt's [facility] who stated that they would like to authorize [facility] but are needing therapy notes to verify that pt can tolerate 3 hours of therapy a day. Zari provided this SW with fax number [phone] and Reference number for case is [id number]. SW faxed therapy notes.    SW received f/u call from Zari who stated they have authorized pt to go to [facility]. Zari stated that they use [employer] to schedule transportation. Phone number is [phone].    SW left report for Unit SW to f/u now that pt has been transferred off ICU.     [facility] Timmy Self  Bxyqe-[phone]  Fax- [phone]    Zari PANG CM w/ [facility]  Uezkk-[phone]  Fax- [phone]   Reference #- [id number]    [employer] (Ground Transportation)  Phone- [phone]

## 2018-07-26 NOTE — CARE PLAN
Problem: Infection  Goal: Will remain free from infection  Outcome: PROGRESSING AS EXPECTED  Pt will remain free of infection during hospitalization.    Problem: Knowledge Deficit  Goal: Knowledge of disease process/condition, treatment plan, diagnostic tests, and medications will improve  Outcome: PROGRESSING AS EXPECTED  Pt will express understanding of current condition, prognosis, treatment plan, and possible outcomes.

## 2018-07-26 NOTE — PROGRESS NOTES
"Pt in room. Pt has 2 IVs -- both flushed and patent. Mc is present and secured. Pt has jean-pierre colored urine. Pt has a feeding tube running Impact at 60/hr. Cortrak secured in place with tape. Pt cleaned, repositioned and boots changed. Oral care has been completed. 2 RN skin check complete -- pt has generalized bruising as well as multiple wound/incision sites. Dressings C/D/I. Left leg dressing changed. Pt states he has \"no pain\".Pt on 2L O2. Call light within reach.   "

## 2018-07-26 NOTE — PROGRESS NOTES
"Pt A/O x4, however, he is making confused statements stating that his nurse \"Rosario is punishing me, and will not feed me or let me drink\", however pt aware that I am his nurse. Also stating he is \"allergic to oxygen\" and it is \"poison\". Note that patient answers orientation questions appropriately. Will continue to monitor.   "

## 2018-07-26 NOTE — PROGRESS NOTES
Pt does not remember if he requested to have a privacy protection password. Gunnar is on the phone trying to reach him. Per pt, Gunnar ok to contact him.

## 2018-07-26 NOTE — PROGRESS NOTES
SICU RN gave report stating that the pt. Has been receiving midodrine for about a week span and that we should speak to the physician about discontinuing this medication. Called trauma NP Cheryl regarding parameters for Midodrine and if we are looking into discontinuing this medication, she stated to keep giving as ordered and they will speak with the neurosurgeon in the am about this medication.

## 2018-07-26 NOTE — DISCHARGE PLANNING
Anticipated Discharge Disposition:   Acute Rehab at [facility]    Action:   LVRANDA for Clair at [facility]. Notified her that pt passed the FEES. Pt is on Nectar Thick Full Liquid diet.     [facility] @ [phone] but this is a fax number.    Barriers to Discharge:   Medical clearance    Plan:   Will follow up with [facility] tomorrow for transportation after MD confirms when pt can be transferred to rehab facility.

## 2018-07-26 NOTE — RESPIRATORY CARE
COPD EDUCATION by COPD CLINICAL EDUCATOR  7/26/2018 at 7:38 AM by Geetha Guajardo     Patient reviewed by COPD education team. Patient does not qualify for COPD program.
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
## 2018-07-26 NOTE — THERAPY
"Occupational Therapy Treatment completed with focus on ADLs, ADL transfers, patient education, cognition and upper extremity function.  Functional Status:  Max A rolling in bed to don TLSO- Max A UB dressing, total assist w/LB dressing, total assist x2 supine>sit EOB, max A w/sitting balance EOB. Perseverating on wanting brace off, able to redirect. W/cues pt was able to use BUE to pull UB forward, but continued to c/o pain and dizziness, VSS /70 in sitting. Assisted pt w/posterior lean and pelvic tilt for comfort briefly calmed, but continued to c/o discomfort. Assisted BTB w/total assist x2. Added waffle mattress, rolled to one side and doffed TLSO pt at 30'. Encouraged to try ti rest. 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 I need someone to go get my cameras and cell phones from my plane...\" and perseverating on getting the brace off and expressing how happy he was that he got to eat. Pt self reports no baseline issues w/his vision, but did have direct complaints about double vision today during session stating \"its hard to focus I see two of everything and it makes me feel funny\". RN notified and aware of session. CNA notified re: turns  Plan of Care: Will benefit from Occupational Therapy 5 times per week  Discharge Recommendations:  Equipment Will Continue to Assess for Equipment Needs. Post-acute therapy Discharge to a transitional care facility for continued skilled therapy services.    See \"Rehab Therapy-Acute\" Patient Summary Report for complete documentation.   "

## 2018-07-26 NOTE — THERAPY
"Speech Language Therapy Clinical Swallow Evaluation completed.  Functional Status: The patient was seen for FEES evaluation this date. FEES procedure explained to patient and patient agreed to proceed and tolerated well. Upon insertion of scope, patient was noted to have symmetric pharynx with minimal clear secretions noted. Presentation of PO included ice chips, nectars, purees, and thins liquids. The patient presented with mild-moderate oropharyngeal dysphagia as evidenced by delayed onset of swallow with quick spillage of bolus over back-side of the epiglottis, weak hyo-laryngeal excursion/pharyngeal squeeze, and decreased BoT retraction resulting in moderate diffuse residue throughout the pharynx. The patient was able to effectively clear residue with use of multiple swallow strategies. Given extent of spillage over the back side of the epiglottis, there remains high concern for penetration/aspiration DURING the swallow.  Thin Liquids resulted in gopal penetration to the laryngeal vestibule. Patient with immediate throat clear response to penetration episodes. Extensive education provided to the patient, including visual evidence of swallow function. Patient adamant about NOT having cortrak replaced and wishes to continue NTFL meals. Recommend aggressive oropharyngeal dysphagia with focus on laryngeal elevation, Base of tongue and pharyngeal squeeze exercises. Close monitoring for s/s of aspiration. Patient must have TLSO on for all PO as patient must have HOB elevated >70*. Strict 1:1 feeding.      Recommendations - Diet:  Nectar Thick Full Liquid                          Strategies: Head of Bed at 90 Degrees                          Medication Administration:  Crush all Medications in Puree    Plan of Care: Will benefit from Speech Therapy 5 times per week  Post-Acute Therapy: Discharge to a transitional care facility for continued skilled therapy services.    See \"Rehab Therapy-Acute\" Patient Summary Report " for complete documentation.

## 2018-07-26 NOTE — CARE PLAN
Problem: Knowledge Deficit  Goal: Knowledge of disease process/condition, treatment plan, diagnostic tests, and medications will improve  Outcome: PROGRESSING SLOWER THAN EXPECTED  Pt educated on need for feeding tube as well as need for braces and mobility limitations.
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
## 2018-07-26 NOTE — PROGRESS NOTES
Pt A&Ox4, numbness and tingling in bilateral feet, denies any pain.    Pt refused bed alarm despite education, call light within reach, pt educated on importance of using the call light when he needs assistance, pt verbalized understanding.

## 2018-07-26 NOTE — PROGRESS NOTES
"Pt. Removed cortrak before KUB could be done. Pt. Refusing to have new tube placed. Making statements that he \"needs to call call Gunnar and if Gunnar can't come get him right now he will hitchhike back home\".   "

## 2018-07-27 ENCOUNTER — APPOINTMENT (OUTPATIENT)
Dept: RADIOLOGY | Facility: MEDICAL CENTER | Age: 58
DRG: 956 | End: 2018-07-27
Attending: NURSE PRACTITIONER
Payer: COMMERCIAL

## 2018-07-27 PROBLEM — D72.829 LEUKOCYTOSIS: Status: ACTIVE | Noted: 2018-07-27

## 2018-07-27 LAB
ALBUMIN SERPL BCP-MCNC: 3 G/DL (ref 3.2–4.9)
ALBUMIN/GLOB SERPL: 1.2 G/DL
ALP SERPL-CCNC: 75 U/L (ref 30–99)
ALT SERPL-CCNC: 120 U/L (ref 2–50)
ANION GAP SERPL CALC-SCNC: 9 MMOL/L (ref 0–11.9)
APPEARANCE UR: CLEAR
AST SERPL-CCNC: 62 U/L (ref 12–45)
BASOPHILS # BLD AUTO: 0.4 % (ref 0–1.8)
BASOPHILS # BLD: 0.06 K/UL (ref 0–0.12)
BILIRUB SERPL-MCNC: 1.1 MG/DL (ref 0.1–1.5)
BILIRUB UR QL STRIP.AUTO: NEGATIVE
BUN SERPL-MCNC: 22 MG/DL (ref 8–22)
CALCIUM SERPL-MCNC: 8 MG/DL (ref 8.5–10.5)
CHLORIDE SERPL-SCNC: 110 MMOL/L (ref 96–112)
CO2 SERPL-SCNC: 19 MMOL/L (ref 20–33)
COLOR UR: YELLOW
CREAT SERPL-MCNC: 0.71 MG/DL (ref 0.5–1.4)
EOSINOPHIL # BLD AUTO: 0.47 K/UL (ref 0–0.51)
EOSINOPHIL NFR BLD: 3.3 % (ref 0–6.9)
ERYTHROCYTE [DISTWIDTH] IN BLOOD BY AUTOMATED COUNT: 56 FL (ref 35.9–50)
GLOBULIN SER CALC-MCNC: 2.6 G/DL (ref 1.9–3.5)
GLUCOSE SERPL-MCNC: 119 MG/DL (ref 65–99)
GLUCOSE UR STRIP.AUTO-MCNC: NEGATIVE MG/DL
HCT VFR BLD AUTO: 29.1 % (ref 42–52)
HGB BLD-MCNC: 9.5 G/DL (ref 14–18)
IMM GRANULOCYTES # BLD AUTO: 0.26 K/UL (ref 0–0.11)
IMM GRANULOCYTES NFR BLD AUTO: 1.8 % (ref 0–0.9)
KETONES UR STRIP.AUTO-MCNC: NEGATIVE MG/DL
LEUKOCYTE ESTERASE UR QL STRIP.AUTO: NEGATIVE
LYMPHOCYTES # BLD AUTO: 1.72 K/UL (ref 1–4.8)
LYMPHOCYTES NFR BLD: 12.2 % (ref 22–41)
MCH RBC QN AUTO: 30.4 PG (ref 27–33)
MCHC RBC AUTO-ENTMCNC: 32.6 G/DL (ref 33.7–35.3)
MCV RBC AUTO: 93 FL (ref 81.4–97.8)
MICRO URNS: NORMAL
MONOCYTES # BLD AUTO: 1.12 K/UL (ref 0–0.85)
MONOCYTES NFR BLD AUTO: 8 % (ref 0–13.4)
NEUTROPHILS # BLD AUTO: 10.44 K/UL (ref 1.82–7.42)
NEUTROPHILS NFR BLD: 74.3 % (ref 44–72)
NITRITE UR QL STRIP.AUTO: NEGATIVE
NRBC # BLD AUTO: 0 K/UL
NRBC BLD-RTO: 0 /100 WBC
PH UR STRIP.AUTO: 8 [PH]
PLATELET # BLD AUTO: 332 K/UL (ref 164–446)
PMV BLD AUTO: 10.2 FL (ref 9–12.9)
POTASSIUM SERPL-SCNC: 3.8 MMOL/L (ref 3.6–5.5)
PROT SERPL-MCNC: 5.6 G/DL (ref 6–8.2)
PROT UR QL STRIP: NEGATIVE MG/DL
RBC # BLD AUTO: 3.13 M/UL (ref 4.7–6.1)
RBC UR QL AUTO: NEGATIVE
SODIUM SERPL-SCNC: 138 MMOL/L (ref 135–145)
SP GR UR STRIP.AUTO: 1.02
UROBILINOGEN UR STRIP.AUTO-MCNC: 2 MG/DL
WBC # BLD AUTO: 14.1 K/UL (ref 4.8–10.8)

## 2018-07-27 PROCEDURE — 700111 HCHG RX REV CODE 636 W/ 250 OVERRIDE (IP): Performed by: SURGERY

## 2018-07-27 PROCEDURE — 36415 COLL VENOUS BLD VENIPUNCTURE: CPT

## 2018-07-27 PROCEDURE — 97535 SELF CARE MNGMENT TRAINING: CPT

## 2018-07-27 PROCEDURE — A9270 NON-COVERED ITEM OR SERVICE: HCPCS | Performed by: PHYSICIAN ASSISTANT

## 2018-07-27 PROCEDURE — A9270 NON-COVERED ITEM OR SERVICE: HCPCS | Performed by: SURGERY

## 2018-07-27 PROCEDURE — 92526 ORAL FUNCTION THERAPY: CPT

## 2018-07-27 PROCEDURE — 85025 COMPLETE CBC W/AUTO DIFF WBC: CPT

## 2018-07-27 PROCEDURE — 700102 HCHG RX REV CODE 250 W/ 637 OVERRIDE(OP): Performed by: SURGERY

## 2018-07-27 PROCEDURE — 700102 HCHG RX REV CODE 250 W/ 637 OVERRIDE(OP): Performed by: NURSE PRACTITIONER

## 2018-07-27 PROCEDURE — 81003 URINALYSIS AUTO W/O SCOPE: CPT

## 2018-07-27 PROCEDURE — 770001 HCHG ROOM/CARE - MED/SURG/GYN PRIV*

## 2018-07-27 PROCEDURE — 97530 THERAPEUTIC ACTIVITIES: CPT

## 2018-07-27 PROCEDURE — 700112 HCHG RX REV CODE 229: Performed by: SURGERY

## 2018-07-27 PROCEDURE — 80053 COMPREHEN METABOLIC PANEL: CPT

## 2018-07-27 PROCEDURE — 700102 HCHG RX REV CODE 250 W/ 637 OVERRIDE(OP): Performed by: PHYSICIAN ASSISTANT

## 2018-07-27 PROCEDURE — A9270 NON-COVERED ITEM OR SERVICE: HCPCS | Performed by: NURSE PRACTITIONER

## 2018-07-27 PROCEDURE — 71045 X-RAY EXAM CHEST 1 VIEW: CPT

## 2018-07-27 PROCEDURE — 700101 HCHG RX REV CODE 250: Performed by: SURGERY

## 2018-07-27 PROCEDURE — 97112 NEUROMUSCULAR REEDUCATION: CPT

## 2018-07-27 RX ADMIN — Medication 1 EACH: at 18:19

## 2018-07-27 RX ADMIN — ENOXAPARIN SODIUM 30 MG: 100 INJECTION SUBCUTANEOUS at 06:21

## 2018-07-27 RX ADMIN — Medication 1 EACH: at 06:21

## 2018-07-27 RX ADMIN — DOCUSATE SODIUM 100 MG: 50 LIQUID ORAL at 18:52

## 2018-07-27 RX ADMIN — ENOXAPARIN SODIUM 30 MG: 100 INJECTION SUBCUTANEOUS at 18:19

## 2018-07-27 RX ADMIN — MIDODRINE HYDROCHLORIDE 10 MG: 5 TABLET ORAL at 06:21

## 2018-07-27 RX ADMIN — DOCUSATE SODIUM 100 MG: 50 LIQUID ORAL at 06:21

## 2018-07-27 RX ADMIN — BISACODYL 10 MG: 10 SUPPOSITORY RECTAL at 06:21

## 2018-07-27 RX ADMIN — STANDARDIZED SENNA CONCENTRATE AND DOCUSATE SODIUM 1 TABLET: 8.6; 5 TABLET, FILM COATED ORAL at 20:46

## 2018-07-27 RX ADMIN — SERTRALINE 100 MG: 100 TABLET, FILM COATED ORAL at 06:21

## 2018-07-27 RX ADMIN — MIDODRINE HYDROCHLORIDE 10 MG: 5 TABLET ORAL at 18:19

## 2018-07-27 RX ADMIN — MIDODRINE HYDROCHLORIDE 10 MG: 5 TABLET ORAL at 12:19

## 2018-07-27 RX ADMIN — POTASSIUM BICARBONATE 25 MEQ: 25 TABLET, EFFERVESCENT ORAL at 06:21

## 2018-07-27 ASSESSMENT — PAIN SCALES - GENERAL
PAINLEVEL_OUTOF10: 3
PAINLEVEL_OUTOF10: 0

## 2018-07-27 ASSESSMENT — ENCOUNTER SYMPTOMS
BACK PAIN: 0
NAUSEA: 0
CONSTIPATION: 0
VOMITING: 0
HEADACHES: 0
NECK PAIN: 0
CHILLS: 0
MYALGIAS: 0
FEVER: 0
DIZZINESS: 0
SENSORY CHANGE: 1
DOUBLE VISION: 1
SPEECH CHANGE: 0
WEAKNESS: 1
ABDOMINAL PAIN: 0
FOCAL WEAKNESS: 1
SHORTNESS OF BREATH: 0
TINGLING: 0
ROS GI COMMENTS: BM 7/25
BLURRED VISION: 1

## 2018-07-27 ASSESSMENT — GAIT ASSESSMENTS: GAIT LEVEL OF ASSIST: UNABLE TO PARTICIPATE

## 2018-07-27 ASSESSMENT — LIFESTYLE VARIABLES: SUBSTANCE_ABUSE: 0

## 2018-07-27 NOTE — DISCHARGE PLANNING
Agency/Facility Name: [facility]  Non Emergency Medical transportation required justification form has been filled out by MD and faxed to [facility].

## 2018-07-27 NOTE — DIETARY
Nutrition Services:  Brief Update    · Pt previously on tube feedings.  · Swallow evaluation by SLP 7/26; recommended Nectar thick, full liquid diet with strict 1:1 feedings.  · Tube feedings are off and Cortrak has been removed.  · Per ADL flow sheet, PO has been ~25% of meals or less.  · Spoke with pt at bedside.  · Pt reports that his appetite is returning, and that intake is low d/t limited choices on current diet.  · Discussed snacks and thickened supplements with pt to increase protein intake; pt denied receiving them at this time.    Recommendations/Plan:  1. Advance diet as tolerated per SLP/MD.  2. Encourage intake of meals.  3. Document intake of all meals as % taken in ADLs to provide interdisciplinary communication across all shifts.   4. Monitor weight.  5. Nutrition rep will continue to see patient for ongoing meal and snack preferences.   6. Obtain supplement order per RD as needed.

## 2018-07-27 NOTE — DISCHARGE PLANNING
Anticipated Discharge Disposition:   Acute Rehab    Action:   Confirmed with Zari at [facility] that the auth has been released to [facility]. She also gave  a number for [facility] [phone] and [phone].    Talked to Cristiana GALVAN, she said that pt is not ready to be discharge yet but she is agreeable for CM to work on transportation arrangements to discharge pt to [facility] on Tuesday morning.     CM talked to Clair Liaison for [facility]. She is agreeable to accept pt on Tuesday morning but she wants pt to reach the hospital by 12 noon. Pt will be going to :  [address] [place] [address].   3rd floor Building [address]  Nursing Station [phone].  Clair said tho that it might be cheaper to fly pt to [place] than ground transportation.   If ground transport then pt needs to leave [facility] by 0700 on Tuesday July 31st.     Talked to Zari at [facility]. She confirmed that they prefer ground transportation but need to call [facility] for further determination. Zari said that pt does not meet criteria for air transportation.     Faxed transportation request to [facility] to call [facility] and prearrange the transport on Tuesday. [employer] form completed and faxed to [facility] also.    Addendum;@ [phone]  Non-emergency medical transportation required justification form needs to be completed. MANDY notified. Form completed and signed and then faxed to [facility].    Barriers to Discharge:   Medical clearance     Plan:   Follow up with Cristiana GALVAN.  Follow up with Alexandro

## 2018-07-27 NOTE — PROGRESS NOTES
Pt is A&Ox4, but makes strange comments from time to time.  Moves upper extremities 4/5, lower extremities flaccid.  Complains of numbness and tingling in lower extremities.  Not OOB this shift.  Pt complains of 6/10 pain.  Oxycodone given PRN.  Pt denies nausea/vomiting.  Tolerating full liquid nectar thick diet.  Mc in place.  Surgical dressings CDI.  POC discussed, pt verbalizes understanding.  Bed alarm on.  Call light within reach, bed in lowest position.

## 2018-07-27 NOTE — CARE PLAN
Problem: Safety  Goal: Will remain free from falls  Outcome: PROGRESSING AS EXPECTED  Pt educated about using call light to call for assistance, using call light appropriately, remains free from falls.    Problem: Bowel/Gastric:  Goal: Normal bowel function is maintained or improved  Outcome: PROGRESSING AS EXPECTED  No spontaneous bowel movements at this time.

## 2018-07-27 NOTE — THERAPY
"Physical Therapy Treatment completed.   Bed Mobility:  Supine to Sit: Maximal Assist (x 2 HOB elevated->long sitting->EOB)  Transfers:Max x 2 seated SB transfer bed->cardiac chair      Plan of Care: Will benefit from Physical Therapy 3 times per week  Discharge Recommendations: Equipment: Will Continue to Assess for Equipment Needs. Recommend [facility] rehab.   See \"Rehab Therapy-Acute\" Patient Summary Report for complete documentation.       "

## 2018-07-27 NOTE — CARE PLAN
Problem: Knowledge Deficit  Goal: Knowledge of disease process/condition, treatment plan, diagnostic tests, and medications will improve  Pt and pt's family updated on plan of care. (Sister called to get update).    Problem: Psychosocial Needs:  Goal: Level of anxiety will decrease  Active listening and reassurance utilized.    Problem: Skin Integrity  Goal: Risk for impaired skin integrity will decrease  Q2H turns in place, waffle mattress in place, skin assessment performed, barrier wipes and cream used, mepilex in place.

## 2018-07-27 NOTE — PROGRESS NOTES
2 RN skin check completed with charge.  No new skin issues, will continue to monitor.  Q2hr turns in place, waffle mattress in place.

## 2018-07-27 NOTE — DISCHARGE PLANNING
Received Transport Form @ 1:11 pm  Spoke to Arthur @ [facility] will need to make arrangement for transport for pt and will inform us if they can have it scheduled for Tuesday. Awaiting MD to fill out transport for for [employer] for transport to [facility] to fax to [facility]. LAKEISHA howard.

## 2018-07-27 NOTE — DISCHARGE PLANNING
Anticipated Discharge Disposition:   [facility]    Action:   Transportation arrangements have been started for possible dc on Tuesday to [facility]    CM rounded and gave pt updates.  Pt informed me his full name is: KIAH CRANE #[id number]    Barriers to Discharge:   Pending medical clearance    Plan:   CM will confirm with Danielle @ [facility] on Monday  Follow up with NP on Monday if pt can be discharge on Tuesday
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
## 2018-07-27 NOTE — PROGRESS NOTES
Call received from Dr. Cruz at [phone]  Case reviewed  Agrees with MRI brain  He will see the pt  Trauma appreciates the consult

## 2018-07-28 ENCOUNTER — APPOINTMENT (OUTPATIENT)
Dept: RADIOLOGY | Facility: MEDICAL CENTER | Age: 58
DRG: 956 | End: 2018-07-28
Attending: NURSE PRACTITIONER
Payer: COMMERCIAL

## 2018-07-28 LAB
ALBUMIN SERPL BCP-MCNC: 3.2 G/DL (ref 3.2–4.9)
ALBUMIN/GLOB SERPL: 1.2 G/DL
ALP SERPL-CCNC: 93 U/L (ref 30–99)
ALT SERPL-CCNC: 95 U/L (ref 2–50)
ANION GAP SERPL CALC-SCNC: 9 MMOL/L (ref 0–11.9)
AST SERPL-CCNC: 40 U/L (ref 12–45)
BASOPHILS # BLD AUTO: 0.4 % (ref 0–1.8)
BASOPHILS # BLD: 0.06 K/UL (ref 0–0.12)
BILIRUB SERPL-MCNC: 1.3 MG/DL (ref 0.1–1.5)
BUN SERPL-MCNC: 24 MG/DL (ref 8–22)
CALCIUM SERPL-MCNC: 8.1 MG/DL (ref 8.5–10.5)
CHLORIDE SERPL-SCNC: 111 MMOL/L (ref 96–112)
CO2 SERPL-SCNC: 19 MMOL/L (ref 20–33)
CREAT SERPL-MCNC: 0.77 MG/DL (ref 0.5–1.4)
EOSINOPHIL # BLD AUTO: 0.42 K/UL (ref 0–0.51)
EOSINOPHIL NFR BLD: 2.9 % (ref 0–6.9)
ERYTHROCYTE [DISTWIDTH] IN BLOOD BY AUTOMATED COUNT: 58.2 FL (ref 35.9–50)
GLOBULIN SER CALC-MCNC: 2.7 G/DL (ref 1.9–3.5)
GLUCOSE SERPL-MCNC: 116 MG/DL (ref 65–99)
HCT VFR BLD AUTO: 30.3 % (ref 42–52)
HGB BLD-MCNC: 10 G/DL (ref 14–18)
IMM GRANULOCYTES # BLD AUTO: 0.24 K/UL (ref 0–0.11)
IMM GRANULOCYTES NFR BLD AUTO: 1.7 % (ref 0–0.9)
LYMPHOCYTES # BLD AUTO: 1.8 K/UL (ref 1–4.8)
LYMPHOCYTES NFR BLD: 12.5 % (ref 22–41)
MCH RBC QN AUTO: 30.4 PG (ref 27–33)
MCHC RBC AUTO-ENTMCNC: 33 G/DL (ref 33.7–35.3)
MCV RBC AUTO: 92.1 FL (ref 81.4–97.8)
MONOCYTES # BLD AUTO: 1.08 K/UL (ref 0–0.85)
MONOCYTES NFR BLD AUTO: 7.5 % (ref 0–13.4)
NEUTROPHILS # BLD AUTO: 10.79 K/UL (ref 1.82–7.42)
NEUTROPHILS NFR BLD: 75 % (ref 44–72)
NRBC # BLD AUTO: 0 K/UL
NRBC BLD-RTO: 0 /100 WBC
PLATELET # BLD AUTO: 385 K/UL (ref 164–446)
PMV BLD AUTO: 10.3 FL (ref 9–12.9)
POTASSIUM SERPL-SCNC: 3.8 MMOL/L (ref 3.6–5.5)
PROT SERPL-MCNC: 5.9 G/DL (ref 6–8.2)
RBC # BLD AUTO: 3.29 M/UL (ref 4.7–6.1)
SODIUM SERPL-SCNC: 139 MMOL/L (ref 135–145)
WBC # BLD AUTO: 14.4 K/UL (ref 4.8–10.8)

## 2018-07-28 PROCEDURE — 700102 HCHG RX REV CODE 250 W/ 637 OVERRIDE(OP): Performed by: PHYSICIAN ASSISTANT

## 2018-07-28 PROCEDURE — A9270 NON-COVERED ITEM OR SERVICE: HCPCS | Performed by: SURGERY

## 2018-07-28 PROCEDURE — 71045 X-RAY EXAM CHEST 1 VIEW: CPT

## 2018-07-28 PROCEDURE — 36415 COLL VENOUS BLD VENIPUNCTURE: CPT

## 2018-07-28 PROCEDURE — 93971 EXTREMITY STUDY: CPT | Mod: 26 | Performed by: SURGERY

## 2018-07-28 PROCEDURE — 770001 HCHG ROOM/CARE - MED/SURG/GYN PRIV*

## 2018-07-28 PROCEDURE — 700102 HCHG RX REV CODE 250 W/ 637 OVERRIDE(OP): Performed by: SURGERY

## 2018-07-28 PROCEDURE — 700111 HCHG RX REV CODE 636 W/ 250 OVERRIDE (IP): Performed by: SURGERY

## 2018-07-28 PROCEDURE — 80053 COMPREHEN METABOLIC PANEL: CPT

## 2018-07-28 PROCEDURE — 93971 EXTREMITY STUDY: CPT

## 2018-07-28 PROCEDURE — 700102 HCHG RX REV CODE 250 W/ 637 OVERRIDE(OP): Performed by: NURSE PRACTITIONER

## 2018-07-28 PROCEDURE — 70551 MRI BRAIN STEM W/O DYE: CPT

## 2018-07-28 PROCEDURE — A9270 NON-COVERED ITEM OR SERVICE: HCPCS | Performed by: PHYSICIAN ASSISTANT

## 2018-07-28 PROCEDURE — A9270 NON-COVERED ITEM OR SERVICE: HCPCS | Performed by: NURSE PRACTITIONER

## 2018-07-28 PROCEDURE — 700101 HCHG RX REV CODE 250: Performed by: SURGERY

## 2018-07-28 PROCEDURE — 85025 COMPLETE CBC W/AUTO DIFF WBC: CPT

## 2018-07-28 PROCEDURE — 700112 HCHG RX REV CODE 229: Performed by: SURGERY

## 2018-07-28 RX ADMIN — ENOXAPARIN SODIUM 30 MG: 100 INJECTION SUBCUTANEOUS at 06:29

## 2018-07-28 RX ADMIN — POTASSIUM BICARBONATE 25 MEQ: 25 TABLET, EFFERVESCENT ORAL at 06:27

## 2018-07-28 RX ADMIN — BISACODYL 10 MG: 10 SUPPOSITORY RECTAL at 06:28

## 2018-07-28 RX ADMIN — MIDODRINE HYDROCHLORIDE 10 MG: 5 TABLET ORAL at 06:27

## 2018-07-28 RX ADMIN — STANDARDIZED SENNA CONCENTRATE AND DOCUSATE SODIUM 1 TABLET: 8.6; 5 TABLET, FILM COATED ORAL at 21:04

## 2018-07-28 RX ADMIN — Medication 1 EACH: at 18:22

## 2018-07-28 RX ADMIN — SERTRALINE 100 MG: 100 TABLET, FILM COATED ORAL at 06:27

## 2018-07-28 RX ADMIN — DOCUSATE SODIUM 100 MG: 50 LIQUID ORAL at 18:22

## 2018-07-28 RX ADMIN — ENOXAPARIN SODIUM 30 MG: 100 INJECTION SUBCUTANEOUS at 18:22

## 2018-07-28 RX ADMIN — MIDODRINE HYDROCHLORIDE 10 MG: 5 TABLET ORAL at 12:16

## 2018-07-28 RX ADMIN — MIDODRINE HYDROCHLORIDE 10 MG: 5 TABLET ORAL at 18:22

## 2018-07-28 RX ADMIN — Medication 1 EACH: at 06:29

## 2018-07-28 RX ADMIN — MAGNESIUM HYDROXIDE 30 ML: 400 SUSPENSION ORAL at 06:29

## 2018-07-28 ASSESSMENT — ENCOUNTER SYMPTOMS
CONSTITUTIONAL NEGATIVE: 1
WEAKNESS: 1
VOMITING: 0
ROS GI COMMENTS: BM 7/25
SENSORY CHANGE: 1
SHORTNESS OF BREATH: 0
MYALGIAS: 0
DIZZINESS: 0
FOCAL WEAKNESS: 1
CONSTIPATION: 0
SENSORY CHANGE: 0
ABDOMINAL PAIN: 0
NAUSEA: 0
BLURRED VISION: 1
TINGLING: 0
DOUBLE VISION: 1
NECK PAIN: 0
CHILLS: 0
SPEECH CHANGE: 0
FEVER: 0
BACK PAIN: 0
HEADACHES: 0

## 2018-07-28 ASSESSMENT — LIFESTYLE VARIABLES: SUBSTANCE_ABUSE: 0

## 2018-07-28 ASSESSMENT — PAIN SCALES - GENERAL
PAINLEVEL_OUTOF10: 0
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
## 2018-07-28 NOTE — PROGRESS NOTES
Applied clean extra set of pads to patients cervical collar. Any adjustments needed or questions please call traction at ext - [phone].
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
## 2018-07-28 NOTE — CARE PLAN
Problem: Infection  Goal: Will remain free from infection  Outcome: PROGRESSING AS EXPECTED  No signs/symptoms of infection observed. Pt educated about hand washing.     Problem: Respiratory:  Goal: Respiratory status will improve  Outcome: PROGRESSING AS EXPECTED  Pt has no difficulty breathing, remains over 90% on RA; using IS appropriately.

## 2018-07-28 NOTE — PROGRESS NOTES
Pt is calm and pleasant, A&O4, somewhat withdrawn. Up to cardiac chair for 1 hour with assistance of 2 PTs. Pt is cooperative, denies any pain throughout the shift. Speech therapy evaluation during the day, recommended full liquid diet. Bed in lowest position, bed rails x3 up, call light within reach. Pt calls for assistance adequately.

## 2018-07-28 NOTE — CARE PLAN
Problem: Safety  Goal: Will remain free from falls  Outcome: PROGRESSING AS EXPECTED  Assessed the patient for fall risk factors. Provided education regarding the need to call for assistance. Bed alarm in place, bed in lowest position, call light in reach.      Problem: Skin Integrity  Goal: Risk for impaired skin integrity will decrease  Outcome: PROGRESSING AS EXPECTED  Assessed pt for skin breakdown.  Waffle mattress in place, mepilex in place.

## 2018-07-28 NOTE — PROGRESS NOTES
2 RN skin check completed with charge. No new skin issues noted. Back and neck surgical incisions open to air. Waffle mattress in place. Mepilex in place to sacrum. Q2H turns in place. Will continue to monitor.
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
## 2018-07-29 ENCOUNTER — APPOINTMENT (OUTPATIENT)
Dept: RADIOLOGY | Facility: MEDICAL CENTER | Age: 58
DRG: 956 | End: 2018-07-29
Attending: NURSE PRACTITIONER
Payer: COMMERCIAL

## 2018-07-29 LAB
ALBUMIN SERPL BCP-MCNC: 3.2 G/DL (ref 3.2–4.9)
ALBUMIN/GLOB SERPL: 1.1 G/DL
ALP SERPL-CCNC: 96 U/L (ref 30–99)
ALT SERPL-CCNC: 80 U/L (ref 2–50)
ANION GAP SERPL CALC-SCNC: 9 MMOL/L (ref 0–11.9)
AST SERPL-CCNC: 36 U/L (ref 12–45)
BASOPHILS # BLD AUTO: 0.6 % (ref 0–1.8)
BASOPHILS # BLD: 0.07 K/UL (ref 0–0.12)
BILIRUB SERPL-MCNC: 1.1 MG/DL (ref 0.1–1.5)
BUN SERPL-MCNC: 23 MG/DL (ref 8–22)
CALCIUM SERPL-MCNC: 8.2 MG/DL (ref 8.5–10.5)
CHLORIDE SERPL-SCNC: 107 MMOL/L (ref 96–112)
CO2 SERPL-SCNC: 18 MMOL/L (ref 20–33)
CREAT SERPL-MCNC: 0.79 MG/DL (ref 0.5–1.4)
EOSINOPHIL # BLD AUTO: 0.43 K/UL (ref 0–0.51)
EOSINOPHIL NFR BLD: 3.4 % (ref 0–6.9)
ERYTHROCYTE [DISTWIDTH] IN BLOOD BY AUTOMATED COUNT: 61 FL (ref 35.9–50)
GLOBULIN SER CALC-MCNC: 2.8 G/DL (ref 1.9–3.5)
GLUCOSE SERPL-MCNC: 113 MG/DL (ref 65–99)
HCT VFR BLD AUTO: 32.3 % (ref 42–52)
HGB BLD-MCNC: 10.5 G/DL (ref 14–18)
IMM GRANULOCYTES # BLD AUTO: 0.13 K/UL (ref 0–0.11)
IMM GRANULOCYTES NFR BLD AUTO: 1 % (ref 0–0.9)
LYMPHOCYTES # BLD AUTO: 1.9 K/UL (ref 1–4.8)
LYMPHOCYTES NFR BLD: 15.1 % (ref 22–41)
MCH RBC QN AUTO: 30.5 PG (ref 27–33)
MCHC RBC AUTO-ENTMCNC: 32.5 G/DL (ref 33.7–35.3)
MCV RBC AUTO: 93.9 FL (ref 81.4–97.8)
MONOCYTES # BLD AUTO: 0.97 K/UL (ref 0–0.85)
MONOCYTES NFR BLD AUTO: 7.7 % (ref 0–13.4)
NEUTROPHILS # BLD AUTO: 9.1 K/UL (ref 1.82–7.42)
NEUTROPHILS NFR BLD: 72.2 % (ref 44–72)
NRBC # BLD AUTO: 0 K/UL
NRBC BLD-RTO: 0 /100 WBC
PLATELET # BLD AUTO: 433 K/UL (ref 164–446)
PMV BLD AUTO: 10.3 FL (ref 9–12.9)
POTASSIUM SERPL-SCNC: 3.8 MMOL/L (ref 3.6–5.5)
PROT SERPL-MCNC: 6 G/DL (ref 6–8.2)
RBC # BLD AUTO: 3.44 M/UL (ref 4.7–6.1)
SODIUM SERPL-SCNC: 134 MMOL/L (ref 135–145)
WBC # BLD AUTO: 12.6 K/UL (ref 4.8–10.8)

## 2018-07-29 PROCEDURE — 36415 COLL VENOUS BLD VENIPUNCTURE: CPT

## 2018-07-29 PROCEDURE — 700102 HCHG RX REV CODE 250 W/ 637 OVERRIDE(OP): Performed by: SURGERY

## 2018-07-29 PROCEDURE — 700111 HCHG RX REV CODE 636 W/ 250 OVERRIDE (IP): Performed by: SURGERY

## 2018-07-29 PROCEDURE — 700102 HCHG RX REV CODE 250 W/ 637 OVERRIDE(OP): Performed by: NURSE PRACTITIONER

## 2018-07-29 PROCEDURE — A9270 NON-COVERED ITEM OR SERVICE: HCPCS | Performed by: SURGERY

## 2018-07-29 PROCEDURE — 700101 HCHG RX REV CODE 250: Performed by: SURGERY

## 2018-07-29 PROCEDURE — 80053 COMPREHEN METABOLIC PANEL: CPT

## 2018-07-29 PROCEDURE — 700102 HCHG RX REV CODE 250 W/ 637 OVERRIDE(OP): Performed by: PHYSICIAN ASSISTANT

## 2018-07-29 PROCEDURE — 71045 X-RAY EXAM CHEST 1 VIEW: CPT

## 2018-07-29 PROCEDURE — 770001 HCHG ROOM/CARE - MED/SURG/GYN PRIV*

## 2018-07-29 PROCEDURE — A9270 NON-COVERED ITEM OR SERVICE: HCPCS | Performed by: PHYSICIAN ASSISTANT

## 2018-07-29 PROCEDURE — 700112 HCHG RX REV CODE 229: Performed by: SURGERY

## 2018-07-29 PROCEDURE — 85025 COMPLETE CBC W/AUTO DIFF WBC: CPT

## 2018-07-29 PROCEDURE — A9270 NON-COVERED ITEM OR SERVICE: HCPCS | Performed by: NURSE PRACTITIONER

## 2018-07-29 RX ORDER — DOCUSATE SODIUM 100 MG/1
100 CAPSULE, LIQUID FILLED ORAL 2 TIMES DAILY
Status: DISCONTINUED | OUTPATIENT
Start: 2018-07-29 | End: 2018-07-31 | Stop reason: HOSPADM

## 2018-07-29 RX ORDER — POTASSIUM CHLORIDE 20 MEQ/1
20 TABLET, EXTENDED RELEASE ORAL DAILY
Status: DISCONTINUED | OUTPATIENT
Start: 2018-07-30 | End: 2018-07-31 | Stop reason: HOSPADM

## 2018-07-29 RX ADMIN — MIDODRINE HYDROCHLORIDE 10 MG: 5 TABLET ORAL at 05:49

## 2018-07-29 RX ADMIN — STANDARDIZED SENNA CONCENTRATE AND DOCUSATE SODIUM 1 TABLET: 8.6; 5 TABLET, FILM COATED ORAL at 22:04

## 2018-07-29 RX ADMIN — POTASSIUM BICARBONATE 25 MEQ: 25 TABLET, EFFERVESCENT ORAL at 05:49

## 2018-07-29 RX ADMIN — ACETAMINOPHEN 650 MG: 325 TABLET, FILM COATED ORAL at 15:33

## 2018-07-29 RX ADMIN — BISACODYL 10 MG: 10 SUPPOSITORY RECTAL at 05:51

## 2018-07-29 RX ADMIN — MIDODRINE HYDROCHLORIDE 10 MG: 5 TABLET ORAL at 12:14

## 2018-07-29 RX ADMIN — ENOXAPARIN SODIUM 30 MG: 100 INJECTION SUBCUTANEOUS at 17:08

## 2018-07-29 RX ADMIN — MIDODRINE HYDROCHLORIDE 10 MG: 5 TABLET ORAL at 17:08

## 2018-07-29 RX ADMIN — Medication 1 EACH: at 05:50

## 2018-07-29 RX ADMIN — DOCUSATE SODIUM 100 MG: 100 CAPSULE, LIQUID FILLED ORAL at 17:08

## 2018-07-29 RX ADMIN — SERTRALINE 100 MG: 100 TABLET, FILM COATED ORAL at 05:50

## 2018-07-29 RX ADMIN — ENOXAPARIN SODIUM 30 MG: 100 INJECTION SUBCUTANEOUS at 05:50

## 2018-07-29 ASSESSMENT — ENCOUNTER SYMPTOMS
CONSTIPATION: 0
SENSORY CHANGE: 1
SENSORY CHANGE: 0
BACK PAIN: 0
CHILLS: 0
WEAKNESS: 1
SPEECH CHANGE: 0
TINGLING: 0
FEVER: 0
NAUSEA: 0
CONSTITUTIONAL NEGATIVE: 1
BLURRED VISION: 1
MYALGIAS: 0
ROS GI COMMENTS: BM 7/28
VOMITING: 0
ABDOMINAL PAIN: 0
SHORTNESS OF BREATH: 0
HEADACHES: 0
FOCAL WEAKNESS: 1
DOUBLE VISION: 1
DIZZINESS: 0
NECK PAIN: 0

## 2018-07-29 ASSESSMENT — PAIN SCALES - GENERAL
PAINLEVEL_OUTOF10: 3
PAINLEVEL_OUTOF10: 0

## 2018-07-29 ASSESSMENT — LIFESTYLE VARIABLES: SUBSTANCE_ABUSE: 0

## 2018-07-29 NOTE — CARE PLAN
Problem: Safety  Goal: Will remain free from injury  Patient is alert and oriented x 4, able to use call light appropriately. Safety education provided, verbalized understanding.     Problem: Skin Integrity  Goal: Risk for impaired skin integrity will decrease  Ensures turning every 2 hours. Mepilex in place on coccyx.
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
## 2018-07-29 NOTE — CARE PLAN
Problem: Communication  Goal: The ability to communicate needs accurately and effectively will improve  Outcome: PROGRESSING AS EXPECTED  Pt communicates needs effectively.   Call light w/in reach. Hourly rounding in place.     Problem: Pain Management  Goal: Pain level will decrease to patient's comfort goal  Outcome: PROGRESSING AS EXPECTED  Pt denies any pain at this.   Will continue to monitor & implement pharmacological & non-pharmacological methods PRN.

## 2018-07-29 NOTE — PROGRESS NOTES
Pt is calm and pleasant, A&O4.   Up to cardiac chair for 30 min with assistance of 2 CNAs and RN.. Pt is cooperative, denies any pain throughout the shift. MRI is done at 1636 today. Opthalmology consultation about blurred vision and impaired left eye movement at 1608. Recommended wearing eye patch at all times.   Pt consumed about 75% of his breakfast, requested cream wheat for lunch and dinner. Kitchen contacted.  Bed in lowest position, bed rails x3 up, call light within reach. Pt calls for assistance adequately.
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
## 2018-07-29 NOTE — PROGRESS NOTES
Pt is A&Ox4. Denies any n/v or pain. Pt states BLE numbness. Pt's appetite is improving & did not demonstrate any swallowing issues while eating. Heel float boots, Q2H turns, mepilex, & extra pillows in place to prevent skin breakdown. Pt updated on POC. All needs met at this time. Pt is instructed to call when in need of assistance. Bed in lowest and locked position. Call light within reach. Bed alarm and hourly rounding in place.

## 2018-07-30 ENCOUNTER — APPOINTMENT (OUTPATIENT)
Dept: RADIOLOGY | Facility: MEDICAL CENTER | Age: 58
DRG: 956 | End: 2018-07-30
Attending: NURSE PRACTITIONER
Payer: COMMERCIAL

## 2018-07-30 PROBLEM — J96.01 ACUTE RESPIRATORY FAILURE WITH HYPOXIA (HCC): Status: RESOLVED | Noted: 2018-07-17 | Resolved: 2018-07-30

## 2018-07-30 LAB
ALBUMIN SERPL BCP-MCNC: 3.3 G/DL (ref 3.2–4.9)
ALBUMIN/GLOB SERPL: 1.1 G/DL
ALP SERPL-CCNC: 107 U/L (ref 30–99)
ALT SERPL-CCNC: 84 U/L (ref 2–50)
ANION GAP SERPL CALC-SCNC: 9 MMOL/L (ref 0–11.9)
AST SERPL-CCNC: 39 U/L (ref 12–45)
BASOPHILS # BLD AUTO: 0.5 % (ref 0–1.8)
BASOPHILS # BLD: 0.06 K/UL (ref 0–0.12)
BILIRUB SERPL-MCNC: 1.2 MG/DL (ref 0.1–1.5)
BUN SERPL-MCNC: 21 MG/DL (ref 8–22)
CALCIUM SERPL-MCNC: 8.4 MG/DL (ref 8.5–10.5)
CHLORIDE SERPL-SCNC: 108 MMOL/L (ref 96–112)
CO2 SERPL-SCNC: 18 MMOL/L (ref 20–33)
CREAT SERPL-MCNC: 0.74 MG/DL (ref 0.5–1.4)
EOSINOPHIL # BLD AUTO: 0.4 K/UL (ref 0–0.51)
EOSINOPHIL NFR BLD: 3.4 % (ref 0–6.9)
ERYTHROCYTE [DISTWIDTH] IN BLOOD BY AUTOMATED COUNT: 59.5 FL (ref 35.9–50)
GLOBULIN SER CALC-MCNC: 2.9 G/DL (ref 1.9–3.5)
GLUCOSE SERPL-MCNC: 111 MG/DL (ref 65–99)
HCT VFR BLD AUTO: 32.7 % (ref 42–52)
HGB BLD-MCNC: 10.6 G/DL (ref 14–18)
IMM GRANULOCYTES # BLD AUTO: 0.1 K/UL (ref 0–0.11)
IMM GRANULOCYTES NFR BLD AUTO: 0.9 % (ref 0–0.9)
LYMPHOCYTES # BLD AUTO: 1.78 K/UL (ref 1–4.8)
LYMPHOCYTES NFR BLD: 15.1 % (ref 22–41)
MAGNESIUM SERPL-MCNC: 2.5 MG/DL (ref 1.5–2.5)
MCH RBC QN AUTO: 30 PG (ref 27–33)
MCHC RBC AUTO-ENTMCNC: 32.4 G/DL (ref 33.7–35.3)
MCV RBC AUTO: 92.6 FL (ref 81.4–97.8)
MONOCYTES # BLD AUTO: 0.95 K/UL (ref 0–0.85)
MONOCYTES NFR BLD AUTO: 8.1 % (ref 0–13.4)
NEUTROPHILS # BLD AUTO: 8.47 K/UL (ref 1.82–7.42)
NEUTROPHILS NFR BLD: 72 % (ref 44–72)
NRBC # BLD AUTO: 0 K/UL
NRBC BLD-RTO: 0 /100 WBC
PHOSPHATE SERPL-MCNC: 2.5 MG/DL (ref 2.5–4.5)
PLATELET # BLD AUTO: 462 K/UL (ref 164–446)
PMV BLD AUTO: 10.1 FL (ref 9–12.9)
POTASSIUM SERPL-SCNC: 3.6 MMOL/L (ref 3.6–5.5)
PROT SERPL-MCNC: 6.2 G/DL (ref 6–8.2)
RBC # BLD AUTO: 3.53 M/UL (ref 4.7–6.1)
SODIUM SERPL-SCNC: 135 MMOL/L (ref 135–145)
WBC # BLD AUTO: 11.8 K/UL (ref 4.8–10.8)

## 2018-07-30 PROCEDURE — A9270 NON-COVERED ITEM OR SERVICE: HCPCS | Performed by: SURGERY

## 2018-07-30 PROCEDURE — 700102 HCHG RX REV CODE 250 W/ 637 OVERRIDE(OP): Performed by: SURGERY

## 2018-07-30 PROCEDURE — A9270 NON-COVERED ITEM OR SERVICE: HCPCS | Performed by: NURSE PRACTITIONER

## 2018-07-30 PROCEDURE — 700111 HCHG RX REV CODE 636 W/ 250 OVERRIDE (IP): Performed by: SURGERY

## 2018-07-30 PROCEDURE — 71045 X-RAY EXAM CHEST 1 VIEW: CPT

## 2018-07-30 PROCEDURE — 83735 ASSAY OF MAGNESIUM: CPT

## 2018-07-30 PROCEDURE — 36415 COLL VENOUS BLD VENIPUNCTURE: CPT

## 2018-07-30 PROCEDURE — 97530 THERAPEUTIC ACTIVITIES: CPT

## 2018-07-30 PROCEDURE — 85025 COMPLETE CBC W/AUTO DIFF WBC: CPT

## 2018-07-30 PROCEDURE — 700102 HCHG RX REV CODE 250 W/ 637 OVERRIDE(OP): Performed by: NURSE PRACTITIONER

## 2018-07-30 PROCEDURE — 80053 COMPREHEN METABOLIC PANEL: CPT

## 2018-07-30 PROCEDURE — 700102 HCHG RX REV CODE 250 W/ 637 OVERRIDE(OP): Performed by: PHYSICIAN ASSISTANT

## 2018-07-30 PROCEDURE — 770001 HCHG ROOM/CARE - MED/SURG/GYN PRIV*

## 2018-07-30 PROCEDURE — A9270 NON-COVERED ITEM OR SERVICE: HCPCS | Performed by: PHYSICIAN ASSISTANT

## 2018-07-30 PROCEDURE — 84100 ASSAY OF PHOSPHORUS: CPT

## 2018-07-30 PROCEDURE — 92526 ORAL FUNCTION THERAPY: CPT

## 2018-07-30 PROCEDURE — 97535 SELF CARE MNGMENT TRAINING: CPT

## 2018-07-30 PROCEDURE — 700112 HCHG RX REV CODE 229: Performed by: SURGERY

## 2018-07-30 RX ORDER — POTASSIUM CHLORIDE 20 MEQ/1
20 TABLET, EXTENDED RELEASE ORAL DAILY
Qty: 60 TAB | Refills: 11
Start: 2018-07-31

## 2018-07-30 RX ORDER — MIDODRINE HYDROCHLORIDE 10 MG/1
10 TABLET ORAL 3 TIMES DAILY
Qty: 60 TAB
Start: 2018-07-30

## 2018-07-30 RX ORDER — OXYCODONE HYDROCHLORIDE 5 MG/1
5 TABLET ORAL EVERY 4 HOURS PRN
Qty: 30 TAB | Refills: 0
Start: 2018-07-30 | End: 2018-08-06

## 2018-07-30 RX ADMIN — DOCUSATE SODIUM 100 MG: 100 CAPSULE, LIQUID FILLED ORAL at 16:49

## 2018-07-30 RX ADMIN — ACETAMINOPHEN 650 MG: 325 TABLET, FILM COATED ORAL at 04:07

## 2018-07-30 RX ADMIN — MIDODRINE HYDROCHLORIDE 10 MG: 5 TABLET ORAL at 04:07

## 2018-07-30 RX ADMIN — ENOXAPARIN SODIUM 30 MG: 100 INJECTION SUBCUTANEOUS at 04:06

## 2018-07-30 RX ADMIN — OXYCODONE HYDROCHLORIDE 5 MG: 5 TABLET ORAL at 10:32

## 2018-07-30 RX ADMIN — DOCUSATE SODIUM 100 MG: 100 CAPSULE, LIQUID FILLED ORAL at 04:07

## 2018-07-30 RX ADMIN — STANDARDIZED SENNA CONCENTRATE AND DOCUSATE SODIUM 1 TABLET: 8.6; 5 TABLET, FILM COATED ORAL at 22:39

## 2018-07-30 RX ADMIN — SERTRALINE 100 MG: 100 TABLET, FILM COATED ORAL at 04:07

## 2018-07-30 RX ADMIN — BISACODYL 10 MG: 10 SUPPOSITORY RECTAL at 04:07

## 2018-07-30 RX ADMIN — MIDODRINE HYDROCHLORIDE 10 MG: 5 TABLET ORAL at 16:49

## 2018-07-30 RX ADMIN — MIDODRINE HYDROCHLORIDE 10 MG: 5 TABLET ORAL at 10:32

## 2018-07-30 RX ADMIN — ENOXAPARIN SODIUM 30 MG: 100 INJECTION SUBCUTANEOUS at 16:48

## 2018-07-30 RX ADMIN — POTASSIUM CHLORIDE 20 MEQ: 1500 TABLET, EXTENDED RELEASE ORAL at 04:08

## 2018-07-30 ASSESSMENT — PATIENT HEALTH QUESTIONNAIRE - PHQ9
SUM OF ALL RESPONSES TO PHQ9 QUESTIONS 1 AND 2: 0
1. LITTLE INTEREST OR PLEASURE IN DOING THINGS: NOT AT ALL
2. FEELING DOWN, DEPRESSED, IRRITABLE, OR HOPELESS: NOT AT ALL

## 2018-07-30 ASSESSMENT — ENCOUNTER SYMPTOMS
DIZZINESS: 0
MYALGIAS: 0
CONSTIPATION: 0
DOUBLE VISION: 1
ROS GI COMMENTS: BM 7/30
FOCAL WEAKNESS: 1
WEAKNESS: 1
SENSORY CHANGE: 1
NAUSEA: 0
CONSTITUTIONAL NEGATIVE: 1
FEVER: 0
ABDOMINAL PAIN: 0
TINGLING: 0
BACK PAIN: 0
VOMITING: 0
SHORTNESS OF BREATH: 0
BLURRED VISION: 1
HEADACHES: 0
SENSORY CHANGE: 0
CHILLS: 0
NECK PAIN: 0
SPEECH CHANGE: 0

## 2018-07-30 ASSESSMENT — LIFESTYLE VARIABLES
SUBSTANCE_ABUSE: 0
ALCOHOL_USE: NO

## 2018-07-30 ASSESSMENT — GAIT ASSESSMENTS: GAIT LEVEL OF ASSIST: UNABLE TO PARTICIPATE

## 2018-07-30 ASSESSMENT — PAIN SCALES - GENERAL
PAINLEVEL_OUTOF10: 3
PAINLEVEL_OUTOF10: 5
PAINLEVEL_OUTOF10: 0

## 2018-07-30 NOTE — PROGRESS NOTES
Pt is A & O x 4. Eager to mobilize today. Placed TLSO in sitting up in bed, transferred to cardiac chair with PT/OT. Diet upgraded to dysphagia 2, still nectar thick liquids per SLP. Pt informed of transfer to [place]. Visitors in room. Reports 3/10 pain in brace, given PRN oxy prior to mobilization. Will continue to monitor pain.

## 2018-07-30 NOTE — THERAPY
"Speech Language Therapy dysphagia treatment completed.   Functional Status:  Pt seen on this date for dysphagia treat. Pt A&Ox4 and agreeable to therapy. PO trials of soft solids and thin liquids via cup sip were presented at bedside and immediate cough, delayed cough, and throat clear were noted with thin liquids in ~75% of trials, which is concerning for penetration/aspiration. No overt s/sx of penetration/aspiration were noted with soft solids. At this time, recommend that pt be upgraded to dysphagia II diet with continuation of nectar thick liquids. It is recommended that pt continue with 1:1 supervision during meals to ensure that pt is tolerating upgrade and that he implements swallowing strategies. Please hold PO with any difficulty or s/sx concerning for aspiration. Pt completed 5 reps of laryngeal exercises with \"good\" accuracy and min-mod clinician support. Dysphagia and role of SLP education provided to pt and he verbalized understanding. SLP will continue to follow for dysphagia therapy.     Recommendations: dysphagia II/NTL, 1:1 supervision during meals, small bites/sips, please hold PO with any difficulty or s/sx concerning for aspiration.  Plan of Care: Will benefit from Speech Therapy 5 times per week  Post-Acute Therapy: Discharge to a transitional care facility for continued skilled therapy services.    See \"Rehab Therapy-Acute\" Patient Summary Report for complete documentation.     "
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
## 2018-07-30 NOTE — CARE PLAN
Problem: Psychosocial Needs:  Goal: Level of anxiety will decrease  Outcome: PROGRESSING SLOWER THAN EXPECTED  Pt remains slightly anxious about TLSO brace.     Problem: Skin Integrity  Goal: Risk for impaired skin integrity will decrease  Outcome: PROGRESSING AS EXPECTED  Skin remains intact aside from surgical incisions, mepilex in place.

## 2018-07-30 NOTE — DISCHARGE PLANNING
Agency/Facility Name: [facility]  Spoke To: Jaswinder  Outcome: Transport was confirmed for 7:00 am tomorrow. RNWILDER howard.

## 2018-07-30 NOTE — PROGRESS NOTES
Peer to peer with Dr. Estrada completed  Proceeding with transfer to [facility] tomorrow morning

## 2018-07-30 NOTE — DISCHARGE PLANNING
Anticipated Discharge Disposition:   Acute Rehab at [facility]    Action:   Received a call from NP stating that pt is medically cleared to be transferred to acute rehab @ [facility] tomorrow.  Asked [facility] to follow up with [facility] to confirm transport for tomorrow.      Received a call from [facility] confirming the discharge tomorrow. She said that their MD needs to talk to our NP so WILDER gave tel of Kaci PICKETT. She also said that dc summary is needed in advance, current MAR and Dc of all images.     Called Lissette at film room to deliver the disk  And to be placed in packet.     Per [facility],pt will be picked up by [facility] at 7am tomorrow.   [facility] at [facility] also confirmed that pt has been accepted  DC Summary and MAR faxed to [facility].     [facility] for [facility] to include a note in EPIC confirming transportation confirmation.     Barriers to Discharge:   Transport confirmation    Plan:   Follow up with [facility].

## 2018-07-30 NOTE — THERAPY
"Physical Therapy Treatment completed.   Bed Mobility:  Supine to Sit: Moderate Assist (x 2 to get pt long sitting w/HOB elevated)  Transfers: Mod assist seated SB transfer bed->cardiac chair     Plan of Care: Will benefit from Physical Therapy 3 times per week  Discharge Recommendations: Equipment: Will Continue to Assess for Equipment Needs.Recommend acute rehab    See \"Rehab Therapy-Acute\" Patient Summary Report for complete documentation.       "

## 2018-07-30 NOTE — PROGRESS NOTES
Patient is alert and oriented x 4. Denies any pain at this time. Takes fluids and food without difficulty, pills floated with apple sauce, tolerated well. Eye patch is on the left eye, denies any discomfort. Eye movement remains dysconjugate. Assisted with repositioning, mepilex on coccyx. Q 2 turns in place. Bed in low position. Encouraged use of call light for assistance. Bed alarm in place. Rounding hourly.
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
## 2018-07-30 NOTE — CARE PLAN
Problem: Safety  Goal: Will remain free from injury  Moderate fall risk. Education provided about safety. Uses call light appropriately.     Problem: Skin Integrity  Goal: Risk for impaired skin integrity will decrease  Q 2 turns in place. Mepilex on coccyx area. Waffle mattress in use.

## 2018-07-31 VITALS
DIASTOLIC BLOOD PRESSURE: 72 MMHG | TEMPERATURE: 98.2 F | HEART RATE: 80 BPM | HEIGHT: 72 IN | WEIGHT: 246.69 LBS | RESPIRATION RATE: 16 BRPM | OXYGEN SATURATION: 96 % | SYSTOLIC BLOOD PRESSURE: 137 MMHG | BODY MASS INDEX: 33.41 KG/M2

## 2018-07-31 PROBLEM — E83.39 HYPOPHOSPHATEMIA: Status: RESOLVED | Noted: 2018-07-19 | Resolved: 2018-07-31

## 2018-07-31 PROCEDURE — A9270 NON-COVERED ITEM OR SERVICE: HCPCS | Performed by: SURGERY

## 2018-07-31 PROCEDURE — 700102 HCHG RX REV CODE 250 W/ 637 OVERRIDE(OP): Performed by: NURSE PRACTITIONER

## 2018-07-31 PROCEDURE — A9270 NON-COVERED ITEM OR SERVICE: HCPCS | Performed by: NURSE PRACTITIONER

## 2018-07-31 PROCEDURE — 700112 HCHG RX REV CODE 229: Performed by: SURGERY

## 2018-07-31 PROCEDURE — 700102 HCHG RX REV CODE 250 W/ 637 OVERRIDE(OP): Performed by: SURGERY

## 2018-07-31 PROCEDURE — 700111 HCHG RX REV CODE 636 W/ 250 OVERRIDE (IP): Performed by: SURGERY

## 2018-07-31 RX ADMIN — DOCUSATE SODIUM 100 MG: 100 CAPSULE, LIQUID FILLED ORAL at 05:23

## 2018-07-31 RX ADMIN — POTASSIUM CHLORIDE 20 MEQ: 1500 TABLET, EXTENDED RELEASE ORAL at 05:24

## 2018-07-31 RX ADMIN — BISACODYL 10 MG: 10 SUPPOSITORY RECTAL at 05:23

## 2018-07-31 RX ADMIN — SERTRALINE 100 MG: 100 TABLET, FILM COATED ORAL at 05:23

## 2018-07-31 RX ADMIN — MIDODRINE HYDROCHLORIDE 10 MG: 5 TABLET ORAL at 05:23

## 2018-07-31 RX ADMIN — ENOXAPARIN SODIUM 30 MG: 100 INJECTION SUBCUTANEOUS at 05:23

## 2018-07-31 ASSESSMENT — ENCOUNTER SYMPTOMS
SENSORY CHANGE: 1
FEVER: 0
BACK PAIN: 0
ABDOMINAL PAIN: 0
DOUBLE VISION: 1
TINGLING: 0
SHORTNESS OF BREATH: 0
BLURRED VISION: 1
NAUSEA: 0
WEAKNESS: 1
HEADACHES: 0
VOMITING: 0
FOCAL WEAKNESS: 1
CHILLS: 0
NECK PAIN: 0
MYALGIAS: 0
CONSTIPATION: 0
DIZZINESS: 0

## 2018-07-31 ASSESSMENT — PAIN SCALES - GENERAL: PAINLEVEL_OUTOF10: 0

## 2018-07-31 NOTE — DISCHARGE PLANNING
note:  Clair at [facility] aware that  was delayed at around 0900. She was agreeable and let her team aware.

## 2018-07-31 NOTE — PROGRESS NOTES
[facility] PANG  [facility]. All discharge information given and all questions asked and answered. Two Ivs have been removed. Mc intact.

## 2018-07-31 NOTE — DISCHARGE PLANNING
Anticipated Discharge Disposition:   [facility]    Action:   Noted that pt is still at [facility] @0805,  was suppose to be at 0700 by [facility]. Asked [facility] to call [facility] and find out why pt is still here.    Barriers to Discharge:  transportation    Plan:   Follow up with [facility].
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
## 2018-07-31 NOTE — DISCHARGE INSTRUCTIONS
Discharge Instructions    Discharged to [facility] by medical transportation with [facility]. Discharged via ambulance, hospital escort: Yes.  Special equipment needed: C-Collar and TLSO    Be sure to schedule a follow-up appointment with your primary care doctor or any specialists as instructed.     Discharge Plan:   Influenza Vaccine Indication: Not indicated: Previously immunized this influenza season and > 8 years of age, Patient Refuses    I understand that a diet low in cholesterol, fat, and sodium is recommended for good health. Unless I have been given specific instructions below for another diet, I accept this instruction as my diet prescription.   Other diet: Full liquid/Nectar thick    Special Instructions: None    · Is patient discharged on Warfarin / Coumadin?   No     Depression / Suicide Risk    As [facility] are [facility], it is important to learn how to keep safe from harming yourself.    Recognize the warning signs:  · Abrupt changes in personality, positive or negative- including increase in energy   · Giving away possessions  · Change in eating patterns- significant weight changes-  positive or negative  · Change in sleeping patterns- unable to sleep or sleeping all the time   · Unwillingness or inability to communicate  · Depression  · Unusual sadness, discouragement and loneliness  · Talk of wanting to die  · Neglect of personal appearance   · Rebelliousness- reckless behavior  · Withdrawal from people/activities they love  · Confusion- inability to concentrate     If you or a loved one observes any of these behaviors or has concerns about self-harm, here's what you can do:  · Talk about it- your feelings and reasons for harming yourself  · Remove any means that you might use to hurt yourself (examples: pills, rope, extension cords, firearm)  · Get professional help from the community (Mental Health, Substance Abuse, psychological counseling)  · Do  not be alone:Call your Safe Contact- someone whom you trust who will be there for you.  · Call your local CRISIS HOTLINE 479-9369 or 360-449-2760  · Call your local Children's Mobile Crisis Response Team [place] (003) 593-9574 or www.Helpjuice.com  · Call the toll free National Suicide Prevention Hotlines   · National Suicide Prevention Lifeline 800-073-WNBR (8461)  · National [employer] Line Network 800-SUICIDE (552-4862)

## 2018-07-31 NOTE — DISCHARGE PLANNING
Agency/Facility Name: [facility]  Spoke To: Jaswinder  Outcome: Called to follow up on on delayed transport and was notified time had changed sometime last evening. Time was changed from 7:00 am to 8:30 am sometime yesterday evening. No advanced notification was given to this [facility]. LAKEISHA Stewart notified.
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
## 2018-07-31 NOTE — CARE PLAN
Problem: Safety  Goal: Will remain free from injury  Outcome: PROGRESSING AS EXPECTED      Problem: Skin Integrity  Goal: Risk for impaired skin integrity will decrease  Outcome: PROGRESSING AS EXPECTED

## 2018-07-31 NOTE — PROGRESS NOTES
Patient is alert and oriented x 4. Denies any pain at this time. Denies any numbness or tingling. Friends are at bedside. Patient has made aware of the discharge plans in the morning. Patient verbalized understanding. Patient is sitting up on bed, TLSO brace on. Repositioned every 2 hours for comfort. Call light within reach.

## (undated) DEVICE — TUBING C&T SET FLYING LEADS DRAIN TUBING (10EA/BX)

## (undated) DEVICE — GLOVE BIOGEL INDICATOR SZ 8.5 SURGICAL PF LTX - (50/BX 4BX/CA)

## (undated) DEVICE — SET EXTENSION WITH 2 PORTS (48EA/CA) ***PART #2C8610 IS A SUBSTITUTE*****

## (undated) DEVICE — POUCH STRL 9 X 3 1/2 IN HTSL D - (250EA/PK 8PK/CA)

## (undated) DEVICE — SUTURE 2-0 VICRYL PLUS CT-1 - 8 X 18 INCH(12/BX)

## (undated) DEVICE — PATTIES SURG NEURO X-RAY 1X1 (10EA/PK 20PK/CA)

## (undated) DEVICE — CELLSAVER PACK

## (undated) DEVICE — KIT EVACUATER 3 SPRING PVC LF 1/8 DRAIN SIZE (10EA/CA)"

## (undated) DEVICE — SURGIFOAM (SIZE 100) - (6EA/CA)

## (undated) DEVICE — Device

## (undated) DEVICE — SPHERE NAVIGATION STEALTH (5EA/TY 12TY/PK)

## (undated) DEVICE — GLOVE BIOGEL INDICATOR SZ 7.5 SURGICAL PF LTX - (50PR/BX 4BX/CA)

## (undated) DEVICE — SYRINGE SAFETY 10 ML 18 GA X 1 1/2 BLUNT LL (100/BX 4BX/CA)

## (undated) DEVICE — CLOSURE SKIN STRIP 1/2 X 4 IN - (STERI STRIP) (50/BX 4BX/CA)

## (undated) DEVICE — SOD. CHL. INJ. 0.9% 1000 ML - (14EA/CA 60CA/PF)

## (undated) DEVICE — GOWN SURGEONS X-LARGE - DISP. (30/CA)

## (undated) DEVICE — STERI STRIP COMPOUND BENZOIN - TINCTURE 0.6ML WITH APPLICATOR (40EA/BX)

## (undated) DEVICE — GLOVE BIOGEL PI ORTHO SZ 8 PF LF (40PR/BX)

## (undated) DEVICE — DRAPE 36X28IN RAD CARM BND BG - (25/CA) O

## (undated) DEVICE — GOWN SURGICAL XX-LARGE - (28EA/CA) SIRUS NON REINFORCED

## (undated) DEVICE — GLOVE BIOGEL PI INDICATOR SZ 7.0 SURGICAL PF LF - (50/BX 4BX/CA)

## (undated) DEVICE — BIT DRILL LONG CALIBRATED 4.2MM X 330MM (4TX2=8)

## (undated) DEVICE — COVER MAYO STAND X-LG - (22EA/CA)

## (undated) DEVICE — DRAPE LARGE 3 QUARTER - (20/CA)

## (undated) DEVICE — DRAPE MICROSCOPE X-LONG (10EA/CA)

## (undated) DEVICE — DRAIN FLAT SUCTION 10MMX20CM - LATEX FREE (10EA/CA)

## (undated) DEVICE — MIDAS LUBRICATOR DIFFUSER PACK (4EA/CA)

## (undated) DEVICE — SYRINGE SAFETY 5 ML 18 GA X 1-1/2 BLUNT LL (100/BX 4BX/CA)

## (undated) DEVICE — GLOVE BIOGEL SZ 6.5 SURGICAL PF LTX (50PR/BX 4BX/CA)

## (undated) DEVICE — PACK JACKSON TABLE KIT W/OUT - HR (6EA/CA)

## (undated) DEVICE — PACK NEURO - (2EA/CA)

## (undated) DEVICE — HEAD HOLDER JUNIOR/ADULT

## (undated) DEVICE — DRAPE U ORTHOPEDIC - (10/BX)

## (undated) DEVICE — LACTATED RINGERS INJ. 500 ML - (24EA/CA)

## (undated) DEVICE — DISSECTING TOOL MIDAS 21MH30

## (undated) DEVICE — LACTATED RINGERS INJ 1000 ML - (14EA/CA 60CA/PF)

## (undated) DEVICE — SUTURE 3-0 VICRYL PLUS SH - 8X 18 INCH (12/BX)

## (undated) DEVICE — TOOL DISSECT MATCH HEAD

## (undated) DEVICE — SUTURE 0 VICRYL PLUS CT-1 - 8 X 18 INCH (12/BX)

## (undated) DEVICE — GLOVE BIOGEL SZ 7 SURGICAL PF LTX - (50PR/BX 4BX/CA)

## (undated) DEVICE — GLOVE BIOGEL PI INDICATOR SZ 8.0 SURGICAL PF LF -(50/BX 4BX/CA)

## (undated) DEVICE — DRAPESURG STERI-DRAPE LONG - (10/BX 4BX/CA)

## (undated) DEVICE — SPONGE GAUZESTER 4 X 4 4PLY - (128PK/CA)

## (undated) DEVICE — CLIP SM INTNL HRZN TI ESCP LGT - (24EA/PK 25PK/BX)

## (undated) DEVICE — SEALER BIPOLAR 2.3 AQUAMANTYS

## (undated) DEVICE — KIT ANESTHESIA W/CIRCUIT & 3/LT BAG W/FILTER (20EA/CA)

## (undated) DEVICE — SYRINGE ASEPTO - (50EA/CA

## (undated) DEVICE — LIGHT CABLE

## (undated) DEVICE — GVL 4 STAT DISPOSABLE - (10/BX)

## (undated) DEVICE — SENSOR SPO2 NEO LNCS ADHESIVE (20/BX) SEE USER NOTES

## (undated) DEVICE — GLOVE BIOGEL PI INDICATOR SZ 6.5 SURGICAL PF LF - (50/BX 4BX/CA)

## (undated) DEVICE — DRESSING TRANSPARENT FILM TEGADERM 4 X 4.75" (50EA/BX)"

## (undated) DEVICE — SLEEVE STERILE  A599T - 30/BX 2BX/CS

## (undated) DEVICE — SUTURE 3-0 VICRYL PLUS RB-1 - 8 X 18 INCH (12/BX)

## (undated) DEVICE — DRAPE SURGICAL U 77X120 - (10/CA)

## (undated) DEVICE — CLIP MED INTNL HRZN TI ESCP - (25/BX)

## (undated) DEVICE — DRAPE C ARMOR (12EA/CA)

## (undated) DEVICE — SUCTION INSTRUMENT YANKAUER BULBOUS TIP W/O VENT (50EA/CA)

## (undated) DEVICE — LIGHT SOURCE MIS 12FT

## (undated) DEVICE — SPONGE PEANUT - (5/PK 50PK/CA)

## (undated) DEVICE — EXTRACTOR SKIN STAPLER - (12EA/BX)

## (undated) DEVICE — GLOVE BIOGEL INDICATOR SZ 8 SURGICAL PF LTX - (50/BX 4BX/CA)

## (undated) DEVICE — NEEDLE NON SAFETY HYPO 22 GA X 1 1/2 IN (100/BX)

## (undated) DEVICE — PEN SKIN MARKER W/RULER - (50EA/BX)

## (undated) DEVICE — SYRINGE SAFETY 3 ML 18 GA X 1 1/2 BLUNT LL (100/BX 8BX/CA)

## (undated) DEVICE — DRAIN BLAKE 10FR 3/4 FLUTED SILICONE CLOSED WOUND SUCTION CHANNEL  - (10/CA)

## (undated) DEVICE — BLANKET WARMING LOWER BODY - (10/CA) INACTIVE USE #8585

## (undated) DEVICE — CANISTER SUCTION 3000ML MECHANICAL FILTER AUTO SHUTOFF MEDI-VAC NONSTERILE LF DISP  (40EA/CA)

## (undated) DEVICE — GLOVE BIOGEL SZ 8 SURGICAL PF LTX - (50PR/BX 4BX/CA)

## (undated) DEVICE — ROD GUIDE BALL TIP 3.0MM X 1000MM

## (undated) DEVICE — GLOVE BIOGEL ECLIPSE PF LATEX SIZE 8.0  (50PR/BX)

## (undated) DEVICE — CHLORAPREP 26 ML APPLICATOR - ORANGE TINT(25/CA)

## (undated) DEVICE — STAPLER SKIN DISP - (6/BX 10BX/CA) VISISTAT

## (undated) DEVICE — HEADREST PRONEVIEW LARGE - (10/CA)

## (undated) DEVICE — DRAPE LAPAROTOMY T SHEET - (12EA/CA)

## (undated) DEVICE — SET BIFURCATED BLOOD - (48EA/CS)

## (undated) DEVICE — NEPTUNE 4 PORT MANIFOLD - (20/PK)

## (undated) DEVICE — GLOVE BIOGEL INDICATOR SZ 6.5 SURGICAL PF LTX - (50PR/BX 4BX/CA)

## (undated) DEVICE — SUTURE 4-0 MONOCRYL PLUS PS-2 - 27 INCH (36/BX)

## (undated) DEVICE — ELECTRODE 850 FOAM ADHESIVE - HYDROGEL RADIOTRNSPRNT (50/PK)

## (undated) DEVICE — RESERVOIR SUCTION 100 CC - SILICONE (20EA/CA)

## (undated) DEVICE — GOWN WARMING STANDARD FLEX - (30/CA)

## (undated) DEVICE — ARMREST CRADLE FOAM - (2PR/PK 12PR/CA)

## (undated) DEVICE — KIT ROOM DECONTAMINATION

## (undated) DEVICE — BOVIE  BLADE 6 EXTENDED - (50/PK)

## (undated) DEVICE — SUCTION TIP STRAIGHT ARGYLE - 50EA/CA

## (undated) DEVICE — PIN HEAD MAYFIELD DISP. (3EA/PK 12PK/BX)

## (undated) DEVICE — TUBING CLEARLINK DUO-VENT - C-FLO (48EA/CA)

## (undated) DEVICE — PACK MAJOR ORTHO - (2EA/CA)

## (undated) DEVICE — MASK ANESTHESIA ADULT  - (100/CA)

## (undated) DEVICE — DILATOR KIT

## (undated) DEVICE — RESTRAINTS LIMB DISP. - (12/BX 4BX/CA)

## (undated) DEVICE — CELLSAVER STAT

## (undated) DEVICE — SLEEVE, VASO, THIGH, MED

## (undated) DEVICE — SPONGE GAUZE STER 4X4 8-PL - (2/PK 50PK/BX 12BX/CS)

## (undated) DEVICE — SUTURE 1 VICRYL PLUS CT-1 - 18 INCH (12/BX)

## (undated) DEVICE — BOVIE BLADE COATED &INSULATED - 25/PK

## (undated) DEVICE — DRAPE IOBAN II INCISE 23X17 - (10EA/BX 4BX/CA)

## (undated) DEVICE — DRAPE MAYO STAND - (30/CA)

## (undated) DEVICE — COVER LIGHT HANDLE FLEXIBLE - SOFT (2EA/PK 80PK/CA)

## (undated) DEVICE — ELECTRODE DUAL RETURN W/ CORD - (50/PK)

## (undated) DEVICE — SUTURE GENERAL

## (undated) DEVICE — SET LEADWIRE 5 LEAD BEDSIDE DISPOSABLE ECG (1SET OF 5/EA)

## (undated) DEVICE — PAD BABY LAP 4X18 W/O - RINGS PREWASHED 5/PK 40PK/CS

## (undated) DEVICE — NEEDLE SPINAL NON-SAFETY 18 GA X 3 IN (25EA/BX)

## (undated) DEVICE — BLANKET WARMING UPPER BODY - (10/CA)

## (undated) DEVICE — SHEET PEDIATRIC LAPAROTOMY - (10/CA)

## (undated) DEVICE — DETERGENT RENUZYME PLUS 10 OZ PACKET (50/BX)

## (undated) DEVICE — SODIUM CHL IRRIGATION 0.9% 1000ML (12EA/CA)

## (undated) DEVICE — BAG DECANTER (50EA/CS)

## (undated) DEVICE — DRESSING XEROFORM 1X8 - (50/BX 4BX/CA)

## (undated) DEVICE — SUTURE 3-0 MONOCRYL PLUS PS-1 - 27 INCH (36/BX)

## (undated) DEVICE — TUBE CONNECT SUCTION CLEAR 120 X 1/4" (50EA/CA)"

## (undated) DEVICE — GLOVE SZ 8 BIOGEL PI MICRO - PF LF (50PR/BX)

## (undated) DEVICE — DISSECT TOOL MIDAS REX

## (undated) DEVICE — DRAPE C-ARM LARGE 41IN X 74 IN - (10/BX 2BX/CA)

## (undated) DEVICE — TOWELS CLOTH SURGICAL - (4/PK 20PK/CA)

## (undated) DEVICE — KIT SURGIFLO W/OUT THROMBIN - (6EA/CA)

## (undated) DEVICE — PROTECTOR ULNA NERVE - (36PR/CA)

## (undated) DEVICE — ENDO PEANUT 173019